# Patient Record
Sex: MALE | Race: WHITE | Employment: OTHER | URBAN - METROPOLITAN AREA
[De-identification: names, ages, dates, MRNs, and addresses within clinical notes are randomized per-mention and may not be internally consistent; named-entity substitution may affect disease eponyms.]

---

## 2017-11-09 ENCOUNTER — HOSPITAL ENCOUNTER (OUTPATIENT)
Dept: PET IMAGING | Age: 72
Discharge: HOME | End: 2017-11-09
Attending: UROLOGY
Payer: MEDICARE

## 2017-11-09 VITALS — WEIGHT: 183 LBS | HEIGHT: 70 IN | BODY MASS INDEX: 26.2 KG/M2

## 2017-11-09 DIAGNOSIS — R97.20 ELEVATED PSA: ICD-10-CM

## 2017-11-09 DIAGNOSIS — C61 MALIGNANT NEOPLASM OF PROSTATE (HCC): ICD-10-CM

## 2017-11-09 PROCEDURE — 78815 PET IMAGE W/CT SKULL-THIGH: CPT

## 2017-11-09 RX ORDER — SODIUM CHLORIDE 0.9 % (FLUSH) 0.9 %
10 SYRINGE (ML) INJECTION
Status: COMPLETED | OUTPATIENT
Start: 2017-11-09 | End: 2017-11-09

## 2017-11-09 RX ADMIN — Medication 10 ML: at 16:34

## 2017-11-28 ENCOUNTER — OFFICE VISIT (OUTPATIENT)
Dept: SURGERY | Age: 72
End: 2017-11-28

## 2017-11-28 VITALS
WEIGHT: 187 LBS | BODY MASS INDEX: 26.77 KG/M2 | HEART RATE: 64 BPM | SYSTOLIC BLOOD PRESSURE: 157 MMHG | HEIGHT: 70 IN | DIASTOLIC BLOOD PRESSURE: 80 MMHG | OXYGEN SATURATION: 95 %

## 2017-11-28 DIAGNOSIS — E04.1 LEFT THYROID NODULE: Primary | ICD-10-CM

## 2017-11-28 RX ORDER — LISINOPRIL 40 MG/1
40 TABLET ORAL DAILY
COMMUNITY

## 2017-11-28 RX ORDER — METOPROLOL TARTRATE 50 MG/1
TABLET ORAL 2 TIMES DAILY
COMMUNITY

## 2017-11-28 RX ORDER — ATORVASTATIN CALCIUM 40 MG/1
40 TABLET, FILM COATED ORAL
COMMUNITY

## 2017-11-28 NOTE — PROGRESS NOTES
HISTORY OF PRESENT ILLNESS  Epifanio Rao is a 67 y.o. male who comes in for consultation by Dr Tyrel Fierro for a thyroid nodule  HPI  He was being worked up for prostate cancer recurrence and had a PET/CT 11/10/2017 demonstrating a 2.6 cm left thyroid nodule. It was not metabolically active. He was found to have local recurrence of his prostate cancer and is to have radiation at some point. He denies feeling any neck lumps, cervical dysphagia, dysphonia, neck pain, palpitations, prior neck/chest radiation, cold/hot intolerance, fatigue, weight change. He denies family hx thyroid disease. Past Medical History:   Diagnosis Date    Cancer [facility])     prostate     Past Surgical History:   Procedure Laterality Date    CARDIAC SURG PROCEDURE UNLIST  07/07/2011    bypass    HX OTHER SURGICAL  2009    prostectomy     History reviewed. No pertinent family history. Social History   Substance Use Topics    Smoking status: Never Smoker    Smokeless tobacco: Never Used    Alcohol use Yes      Comment: daily     Current Outpatient Prescriptions   Medication Sig    lisinopril (PRINIVIL, ZESTRIL) 40 mg tablet Take 40 mg by mouth daily.  atorvastatin (LIPITOR) 40 mg tablet Take  by mouth daily.  metoprolol tartrate (LOPRESSOR) 50 mg tablet Take  by mouth two (2) times a day. No current facility-administered medications for this visit. No Known Allergies    Review of Systems   Constitutional: Negative for chills, diaphoresis, fever, malaise/fatigue and weight loss. HENT: Negative for congestion, ear pain and sore throat. Eyes: Negative for blurred vision and pain. Respiratory: Negative for cough, hemoptysis, sputum production, shortness of breath, wheezing and stridor. Cardiovascular: Negative for chest pain, palpitations, orthopnea, claudication, leg swelling and PND. Gastrointestinal: Positive for heartburn (had recent lower esophageal stricture dilatation).  Negative for abdominal pain, blood in stool, constipation, diarrhea, melena, nausea and vomiting. Genitourinary: Negative for dysuria, flank pain, frequency, hematuria and urgency. Musculoskeletal: Negative for back pain, joint pain, myalgias and neck pain. Skin: Negative for itching and rash. Neurological: Negative for dizziness, tremors, focal weakness, seizures, weakness and headaches. Endo/Heme/Allergies: Negative for polydipsia. Psychiatric/Behavioral: Negative for depression and memory loss. The patient is not nervous/anxious. Visit Vitals    /80 (BP 1 Location: Right arm, BP Patient Position: Sitting)    Pulse 64    Ht 5' 10\" (1.778 m)    Wt 84.8 kg (187 lb)    SpO2 95%    BMI 26.83 kg/m2       Physical Exam   Constitutional: He is oriented to person, place, and time. He appears well-developed and well-nourished. No distress. HENT:   Head: Normocephalic and atraumatic. Mouth/Throat: Oropharynx is clear and moist. No oropharyngeal exudate. Eyes: Conjunctivae and EOM are normal. Pupils are equal, round, and reactive to light. No scleral icterus. Neck: Trachea normal, normal range of motion, full passive range of motion without pain and phonation normal. Neck supple. No tracheal deviation present. No thyromegaly present. Mass: fullness on left but difficult to feel a mass. Cardiovascular: Normal rate, regular rhythm and normal heart sounds. Exam reveals no gallop and no friction rub. No murmur heard. Pulmonary/Chest: Effort normal and breath sounds normal. No stridor. No respiratory distress. He has no wheezes. He has no rales. Abdominal: Soft. Normal appearance and bowel sounds are normal. He exhibits no distension, no pulsatile liver and no mass. There is no hepatosplenomegaly. There is no tenderness. There is no rebound, no guarding and no CVA tenderness. No hernia. Hernia confirmed negative in the right inguinal area and confirmed negative in the left inguinal area.    Genitourinary: Testes normal. Cremasteric reflex is present. Circumcised. Musculoskeletal: Normal range of motion. He exhibits no edema or tenderness. Lymphadenopathy:     He has no cervical adenopathy. Right: No supraclavicular adenopathy present. Left: No supraclavicular adenopathy present. Neurological: He is alert and oriented to person, place, and time. No cranial nerve deficit. Coordination normal.   Skin: Skin is warm and dry. No rash noted. He is not diaphoretic. No erythema. Psychiatric: He has a normal mood and affect. His behavior is normal. Judgment and thought content normal.       ASSESSMENT and PLAN  1. Left thyroid nodule. I explained about the anatomy and pathophysiology of thyroid nodules/disease. I explained about possible malignancy. I discussed FNA, observation, possible thyroid suppression pending FNA, and total thyroidectomy. Risks of surgery include bleeding (potentially requiring emergent exploration at bedside), infection, parathyroid injury/removal requiring supplemental calcium +/- vit d, recurrent laryngeal/superior laryngeal nerve injury resulting in vocal cord paralysis, voice changes and fatigue, aspiration, further surgery, need for lifelong thyroid supplementation. US in office suggests multinodular goiter with left dominant nodule  Check TSH and free T4 levels  Did US guided FNA today  Will get formal US to document dimensions  Will call with results  2. Prostate cancer. Local recurrence. Still deciding which option for local control  3. CAD  S/p CABG 7/2011  4. GERD/esophageal stricture. S/p dilatation 11/2017 and had colonoscopy in [facility]  5.   Essential hypertension      Rocío Thomson MD FACS

## 2017-11-28 NOTE — MR AVS SNAPSHOT
Visit Information Date & Time Provider Department Dept. Phone Encounter #  
 11/28/2017 10:00 AM Cyndi Anderson MD [facility] [id number] Upcoming Health Maintenance Date Due Hepatitis C Screening 1945 DTaP/Tdap/Td series (1 - Tdap) 11/26/1966 FOBT Q 1 YEAR AGE 50-75 11/26/1995 ZOSTER VACCINE AGE 60> 9/26/2005 GLAUCOMA SCREENING Q2Y 11/26/2010 Pneumococcal 65+ High/Highest Risk (1 of 2 - PCV13) 11/26/2010 MEDICARE YEARLY EXAM 11/26/2010 Influenza Age 5 to Adult 8/1/2017 Allergies as of 11/28/2017  Review Complete On: 11/28/2017 By: Cyndi Anderson MD  
 No Known Allergies Current Immunizations  Never Reviewed No immunizations on file. Not reviewed this visit You Were Diagnosed With   
  
 Codes Comments Left thyroid nodule    -  Primary ICD-10-CM: E04.1 ICD-9-CM: 241.0 Vitals BP Pulse Height(growth percentile) Weight(growth percentile) SpO2 BMI  
 157/80 (BP 1 Location: Right arm, BP Patient Position: Sitting) 64 5' 10\" (1.778 m) 187 lb (84.8 kg) 95% 26.83 kg/m2 Smoking Status Never Smoker Vitals History BMI and BSA Data Body Mass Index Body Surface Area  
 26.83 kg/m 2 2.05 m 2 Your Updated Medication List  
  
   
This list is accurate as of: 11/28/17 10:44 AM.  Always use your most recent med list.  
  
  
  
  
 atorvastatin 40 mg tablet Commonly known as:  LIPITOR Take  by mouth daily. lisinopril 40 mg tablet Commonly known as:  Nicole Benito Take 40 mg by mouth daily. metoprolol tartrate 50 mg tablet Commonly known as:  LOPRESSOR Take  by mouth two (2) times a day. We Performed the Following T4, FREE [id number] CPT(R)] TSH 3RD GENERATION [49355 CPT(R)] To-Do List   
 11/28/2017   Imaging:  US THYROID/PARATHYROID/SOFT TISS   
  
 11/30/2017 8:00 AM  
 Appointment with Ever Santos 2 at [facility] Ultrasound ([phone]) GENERAL INSTRUCTIONS  1. Bring outside films ([employer]) pertaining to the affected area with you on the day of appointment. 2. A written order with a valid diagnosis and Physicians signature is required for all scheduled tests. 3. Check in at registration 30 minutes before your appointment time unless you were instructed to do otherwise. 12/26/2017 10:00 AM  
  Appointment with RAD ONC THERAPY [facility] at [phone] ([phone]) Introducing [facility] SERVICES! [facility] introduces iovox patient portal. Now you can access parts of your medical record, email your doctor's office, and request medication refills online. 1. In your internet browser, go to [email or web address] You a [email or web address] 2. Click on the First Time User? Click Here link in the Sign In box. You will see the New Member Sign Up page. 3. Enter your iovox Access Code exactly as it appears below. You will not need to use this code after youve completed the sign-up process. If you do not sign up before the expiration date, you must request a new code. · iovox Access Code: [email or web address] Expires: 2/6/2018  8:57 AM 
 
4. Enter the last four digits of your Social Security Number (xxxx) and Date of Birth (mm/dd/yyyy) as indicated and click Submit. You will be taken to the next sign-up page. 5. Create a [employer] ID. This will be your iovox login ID and cannot be changed, so think of one that is secure and easy to remember. 6. Create a iovox password. You can change your password at any time. 7. Enter your Password Reset Question and Answer. This can be used at a later time if you forget your password. 8. Enter your e-mail address. You will receive e-mail notification when new information is available in [address]. 9. Click Sign Up. You can now view and download portions of your medical record. 10. Click the Download Summary menu link to download a portable copy of your medical information. If you have questions, please visit the Frequently Asked Questions section of the Nveloped website. Remember, Nveloped is NOT to be used for urgent needs. For medical emergencies, dial 911. Now available from your iPhone and Android! Please provide this summary of care documentation to your next provider. Your primary care clinician is listed as ROMEO LAYTON. If you have any questions after today's visit, please call [phone].

## 2017-11-28 NOTE — PROGRESS NOTES
[facility] - [address] [address] [facility]  OFFICE PROCEDURE PROGRESS NOTE        Chart reviewed for the following:   Aga VEGA LPN, have reviewed the History, Physical and updated the Allergic reactions for Chalo Morales performed immediately prior to start of procedure:   Aga VEGA LPN, have performed the following reviews on Stephen Welsh prior to the start of the procedure:            * Patient was identified by name and date of birth   * Agreement on procedure being performed was verified  * Risks and Benefits explained to the patient  * Procedure site verified and marked as necessary  * Patient was positioned for comfort  * Consent was signed and verified     Time: 10:30am      Date of procedure: 11/28/2017    Procedure performed by:  Karine Martinez MD    Provider assisted by: Princess Payne LPN    Patient assisted by: spouse    How tolerated by patient: tolerated the procedure well with no complications    Post Procedural Pain Scale: 0 - No Hurt    Comments: none

## 2017-11-28 NOTE — PROGRESS NOTES
Procedure Note    Pre Procedure Diagnosis:  Left thyroid mass  Post Procedure Diagnosis:  Left thyroid mass  Procedure:  1. Ultrasound guided FNA of left thyroid mass  Surgeon:   Maixmus Garcia MD FACS    EBL minimal    Procedure:    After informed consent, I utilized a [employer] with a high frequency linear array transducer and five 25 gauge needles to perform several passes through a 2,6 cm left thyroid heterogeneous mass. Specimens were placed in specialized containers from [facility] for analysis. He tolerated it well.     Pathology:  Left thyroid nodule    Signed  Maximus Garcia MD FACS

## 2017-11-30 ENCOUNTER — HOSPITAL ENCOUNTER (OUTPATIENT)
Dept: ULTRASOUND IMAGING | Age: 72
Discharge: HOME | End: 2017-11-30
Attending: SURGERY
Payer: MEDICARE

## 2017-11-30 DIAGNOSIS — E04.1 LEFT THYROID NODULE: ICD-10-CM

## 2017-11-30 PROCEDURE — 76536 US EXAM OF HEAD AND NECK: CPT

## 2017-12-01 ENCOUNTER — TELEPHONE (OUTPATIENT)
Dept: SURGERY | Age: 72
End: 2017-12-01

## 2017-12-01 DIAGNOSIS — E04.2 MULTINODULAR GOITER (NONTOXIC): Primary | ICD-10-CM

## 2017-12-01 LAB
T4 FREE SERPL-MCNC: 1.05 NG/DL (ref 0.82–1.77)
TSH SERPL DL<=0.005 MIU/L-ACNC: 0.8 UIU/ML (ref 0.45–4.5)

## 2017-12-01 NOTE — TELEPHONE ENCOUNTER
US  FINDINGS:      The right thyroid lobe contains several nodules, largest 2 measuring 3.4 and 2.8  cm in maximum diameters.     Left thyroid lobe contains several nodules, largest measuring 4.4 cm in maximum  diameter.     Right lobe measures approximately 7.4 x 2.5 and 1.7 cm. Left lobe measures  approximately 6.6 x 3.5 x 2.3 cm.     IMPRESSION  IMPRESSION: Multinodular goiter. FNA cytology benign hyperplastic/adenomatoid nodule      He understands and need for follow up    Plan  1. Repeat US neck in  1 year  2.   RTC in 1 year (after US)    Lg Garrett MD FACS

## 2017-12-21 ENCOUNTER — HOSPITAL ENCOUNTER (OUTPATIENT)
Dept: MRI IMAGING | Age: 72
Discharge: HOME | End: 2017-12-21
Payer: MEDICARE

## 2017-12-21 VITALS — WEIGHT: 189 LBS | BODY MASS INDEX: 27.12 KG/M2

## 2017-12-21 DIAGNOSIS — C61 PROSTATE CANCER (HCC): ICD-10-CM

## 2017-12-21 LAB — CREAT BLD-MCNC: 0.5 MG/DL (ref 0.6–1.3)

## 2017-12-21 PROCEDURE — A9577 INJ MULTIHANCE: HCPCS

## 2017-12-21 PROCEDURE — 82565 ASSAY OF CREATININE: CPT

## 2017-12-21 PROCEDURE — 72197 MRI PELVIS W/O & W/DYE: CPT

## 2017-12-21 PROCEDURE — 74011000258 HC RX REV CODE- 258

## 2017-12-21 PROCEDURE — 74011250636 HC RX REV CODE- 250/636

## 2017-12-21 RX ADMIN — SODIUM CHLORIDE 100 ML: 900 INJECTION, SOLUTION INTRAVENOUS at 10:27

## 2017-12-21 RX ADMIN — GADOBENATE DIMEGLUMINE 17 ML: 529 INJECTION, SOLUTION INTRAVENOUS at 10:28

## 2017-12-26 ENCOUNTER — HOSPITAL ENCOUNTER (OUTPATIENT)
Dept: RADIATION THERAPY | Age: 72
Discharge: HOME | End: 2017-12-26

## 2018-09-06 ENCOUNTER — HOSPITAL ENCOUNTER (OUTPATIENT)
Dept: PREADMISSION TESTING | Age: 73
Discharge: HOME | End: 2018-09-06
Payer: MEDICARE

## 2018-09-06 VITALS — HEIGHT: 70 IN | BODY MASS INDEX: 26.83 KG/M2 | WEIGHT: 187.44 LBS

## 2018-09-06 LAB
ALBUMIN SERPL-MCNC: 3.5 G/DL (ref 3.4–5)
ALBUMIN/GLOB SERPL: 1.2 {RATIO} (ref 0.8–1.7)
ALP SERPL-CCNC: 104 U/L (ref 45–117)
ALT SERPL-CCNC: 30 U/L (ref 16–61)
ANION GAP SERPL CALC-SCNC: 9 MMOL/L (ref 3–18)
APPEARANCE UR: CLEAR
AST SERPL-CCNC: 20 U/L (ref 15–37)
BACTERIA SPEC CULT: NORMAL
BASOPHILS # BLD: 0 K/UL (ref 0–0.1)
BASOPHILS NFR BLD: 0 % (ref 0–2)
BILIRUB SERPL-MCNC: 0.6 MG/DL (ref 0.2–1)
BILIRUB UR QL: NEGATIVE
BUN SERPL-MCNC: 8 MG/DL (ref 7–18)
BUN/CREAT SERPL: 10 (ref 12–20)
CALCIUM SERPL-MCNC: 8.7 MG/DL (ref 8.5–10.1)
CHLORIDE SERPL-SCNC: 103 MMOL/L (ref 100–108)
CO2 SERPL-SCNC: 28 MMOL/L (ref 21–32)
COLOR UR: YELLOW
CREAT SERPL-MCNC: 0.77 MG/DL (ref 0.6–1.3)
DIFFERENTIAL METHOD BLD: ABNORMAL
EOSINOPHIL # BLD: 0.2 K/UL (ref 0–0.4)
EOSINOPHIL NFR BLD: 3 % (ref 0–5)
ERYTHROCYTE [DISTWIDTH] IN BLOOD BY AUTOMATED COUNT: 12.8 % (ref 11.6–14.5)
GLOBULIN SER CALC-MCNC: 3 G/DL (ref 2–4)
GLUCOSE SERPL-MCNC: 111 MG/DL (ref 74–99)
GLUCOSE UR STRIP.AUTO-MCNC: NEGATIVE MG/DL
HCT VFR BLD AUTO: 39.7 % (ref 36–48)
HGB BLD-MCNC: 13.5 G/DL (ref 13–16)
HGB UR QL STRIP: NEGATIVE
KETONES UR QL STRIP.AUTO: NEGATIVE MG/DL
LEUKOCYTE ESTERASE UR QL STRIP.AUTO: NEGATIVE
LYMPHOCYTES # BLD: 1.9 K/UL (ref 0.9–3.6)
LYMPHOCYTES NFR BLD: 26 % (ref 21–52)
MCH RBC QN AUTO: 32.5 PG (ref 24–34)
MCHC RBC AUTO-ENTMCNC: 34 G/DL (ref 31–37)
MCV RBC AUTO: 95.7 FL (ref 74–97)
MONOCYTES # BLD: 0.8 K/UL (ref 0.05–1.2)
MONOCYTES NFR BLD: 11 % (ref 3–10)
NEUTS SEG # BLD: 4.4 K/UL (ref 1.8–8)
NEUTS SEG NFR BLD: 60 % (ref 40–73)
NITRITE UR QL STRIP.AUTO: NEGATIVE
PH UR STRIP: 6 [PH] (ref 5–8)
PLATELET # BLD AUTO: 181 K/UL (ref 135–420)
PMV BLD AUTO: 10.5 FL (ref 9.2–11.8)
POTASSIUM SERPL-SCNC: 3.9 MMOL/L (ref 3.5–5.5)
PROT SERPL-MCNC: 6.5 G/DL (ref 6.4–8.2)
PROT UR STRIP-MCNC: NEGATIVE MG/DL
RBC # BLD AUTO: 4.15 M/UL (ref 4.7–5.5)
SERVICE CMNT-IMP: NORMAL
SODIUM SERPL-SCNC: 140 MMOL/L (ref 136–145)
SP GR UR REFRACTOMETRY: 1.01 (ref 1–1.03)
UROBILINOGEN UR QL STRIP.AUTO: 0.2 EU/DL (ref 0.2–1)
WBC # BLD AUTO: 7.3 K/UL (ref 4.6–13.2)

## 2018-09-06 PROCEDURE — 85025 COMPLETE CBC W/AUTO DIFF WBC: CPT | Performed by: ORTHOPAEDIC SURGERY

## 2018-09-06 PROCEDURE — 80053 COMPREHEN METABOLIC PANEL: CPT | Performed by: ORTHOPAEDIC SURGERY

## 2018-09-06 PROCEDURE — 81003 URINALYSIS AUTO W/O SCOPE: CPT | Performed by: ORTHOPAEDIC SURGERY

## 2018-09-06 PROCEDURE — 36415 COLL VENOUS BLD VENIPUNCTURE: CPT | Performed by: ORTHOPAEDIC SURGERY

## 2018-09-06 PROCEDURE — 87641 MR-STAPH DNA AMP PROBE: CPT | Performed by: ORTHOPAEDIC SURGERY

## 2018-09-06 RX ORDER — SODIUM CHLORIDE, SODIUM LACTATE, POTASSIUM CHLORIDE, CALCIUM CHLORIDE 600; 310; 30; 20 MG/100ML; MG/100ML; MG/100ML; MG/100ML
125 INJECTION, SOLUTION INTRAVENOUS CONTINUOUS
Status: CANCELLED | OUTPATIENT
Start: 2018-09-06

## 2018-09-06 RX ORDER — CEFAZOLIN SODIUM/WATER 2 G/20 ML
2 SYRINGE (ML) INTRAVENOUS ONCE
Status: CANCELLED | OUTPATIENT
Start: 2018-09-06 | End: 2018-09-06

## 2018-09-06 RX ORDER — TRANEXAMIC ACID 650 1/1
1950 TABLET ORAL ONCE
Status: CANCELLED | OUTPATIENT
Start: 2018-09-25 | End: 2018-09-25

## 2018-09-06 NOTE — PERIOP NOTES
No sleep apnea or removable prosthetic devices. Care Fusion kit given to patient with instructions. Reviewed Hernan wipes. No family history of MH. Pt meets criteria for special populations.

## 2018-09-20 ENCOUNTER — HOSPITAL ENCOUNTER (OUTPATIENT)
Dept: PHYSICAL THERAPY | Age: 73
Discharge: HOME | End: 2018-09-20
Payer: MEDICARE

## 2018-09-20 PROCEDURE — 97161 PT EVAL LOW COMPLEX 20 MIN: CPT | Performed by: PHYSICAL THERAPIST

## 2018-09-20 PROCEDURE — 97110 THERAPEUTIC EXERCISES: CPT | Performed by: PHYSICAL THERAPIST

## 2018-09-20 PROCEDURE — G8986 CARRY D/C STATUS: HCPCS | Performed by: PHYSICAL THERAPIST

## 2018-09-20 PROCEDURE — G8985 CARRY GOAL STATUS: HCPCS | Performed by: PHYSICAL THERAPIST

## 2018-09-20 PROCEDURE — G8984 CARRY CURRENT STATUS: HCPCS | Performed by: PHYSICAL THERAPIST

## 2018-09-20 NOTE — PROGRESS NOTES
[facility] - [facility]       [address], [address] [facility] [place]  Ph [phone]  Fx [phone]    Orthopedic Surgical Pre-op Assessment and Plan of Care     Patient name: Ember Davis Start of Care: 2018   Referral source: Kalli Salomon MD : 1945    Medical Diagnosis: Right shoulder pain [M25.511] Surgery Date:2018    Prior Hospitalization: see medical history Provider#: [id number]   Medications: Verified on Patient summary List   Date:2018      [x]  Patient  Verified  Payor: Kelly Mcadams / Plan: [place] PART [place] & B / Product Type: Medicare /          In time:2:30  Out time:3:30  Total Treatment Time (min): 60  Total Timed Codes (min): 30  1:1 Treatment Time ( [address] only): 60   Visit #: 1 of 1           Subjective Review:  Pain Level (0-10 scale): 5  Any medication changes, allergies to medications, adverse drug reactions, diagnosis change, or new procedure performed?: [x] No    [] Yes (see summary sheet for update)    Patient has CC of right shoulder pain for 15-20 years. NATALIE: none. Patient describes pain as throbbing, sharp, intermittent. No diurnal features. Reports tingling into the last two digits on occasion. Denies numbness/tingling. Denies popping/clicking. Aggravating factors:golf, lifting, raising arm, . Alleviating factors: CBD, . Denies red flags: SOB, chest pain, dizziness/lightheadedness, blurred/double vision, HA, chills/fevers, night sweats, change in bowel/bladder control, abdominal pain, difficulty swallowing, slurred speech, unexplained weight gain/loss. PMHx: CABG 2011, . Surgical Hx: prostate 99, . Social Hx: single level home, regular alcohol, tobacco, work status: . PLOF: golf, swimming. CLOF: unable to play [employer]. Diagnostic Imaging: x-ray: OA      Motivation: good  Substance use: [x] Alcohol     [] tobacco [] other:  Cognition: A & O x 4    Other:    What type of home do you live in now?  Single level    Do you plan to return there after surgery? Yes                   If no, where are planning to live after surgery? NA    How many stairs/steps to enter your home? 3 Railing:  Yes    How many stairs/steps inside of your home? 0 Railing:  Not applicable     Can you stay on the entry level? Yes    Do you live alone? No    Will anyone be available to help you when you leave the hospital?  Yes                If yes, what is their relationship to you? Wife         Can they assist you with bathing and dressing? Yes     Can someone assist you with transportation to physical therapy? Yes    Where do you plan to go when you leave the hospital? home    What is your employment status? [] Retired     [] Disabled     [x] Employed  []  Unemployed  If EMPLOYED, job requires mainly: [] Sitting     [] Light Activity     [] Heavy Activity  Do you own or are any of the following items available to you (check all that apply)? [] Walker: [] Wheels [] No Wheels     [] Zain Kussmala     [] Crutches     [] Shower Chair/Tub Bench   [] Bedside Commode/Elevated Toilet Seat     [] Other equipment: In your bathroom, do you have the following? Check all that apply. [] Tub Only   [] Tub/Shower Combination  [x] Walk-in Shower    Are you:  right handed     Describe your Functional Level (check all that apply):     Bathing [x] Independent   [] Require Assistance from someone   [] Sponge Bath Only        Dressing [x] Independent     [] Require assistance from someone for: [] socks/shoes   [] pants   [] everything   Walking [x] Independent   [] Walk indoors only   [] Walk outdoors   [] Use assistive device       [] Use wheelchair only        Household Activities [x] Routine house & yard work   [] Light house work only   [] None                     ROM:  [] Unable to assess at this time     Strength:   [] Unable to assess at this time                                                                                                    [] Left  [] Right Gross Strength Left Right   Elevation against gravity [x] WNL  [] Impaired  [] Unable Flexion [x] WNL  [] Impaired  [] Unable [x] WNL  [] Impaired  [] Unable   Quality of motion [] WNL  [x] Impaired   Scaption/ABD [x] WNL  [] Impaired  [] Unable [x] WNL  [] Impaired  [] Unable   Endfeel [] Soft  [x] Hard  [] Springy  [] Empty IR/ER [x] WNL  [] Impaired  [] Unable [x] WNL  [] Impaired  [] Unable     Objective Screen:  1. Functional testing to determine fall risk (to be completed with a sling on the operative arm)  a. TUG: Time 8.09sec    Assistive Device: none    Fall Risk :  No * a score of >14 seconds indicates fall risk   (low complexity <10sec, moderate complexity 10-20sec, high complexity >20sec)  b. Stair Climbing Test: Time 10sec  Assistive Device: none   Fall Risk :   No  c. Functional Screen:   Sit-Supine:  Independent           Supine-sit:  Independent                Transfers:    Independent        Static Balance: 2 trials for 30 seconds with no assistance   EO/EC    [place]: [x]Floor   []Foam  [] Other    MSR:           []Floor   []Foam  [] Other  Gait Distance/Safety:   Household Distances with none and no assistance. Capable caregiver  Not applicable  Community Distances with none  and no assistance. Capable caregiver  Not applicable    30 min Therapeutic Exercise:  [x] See flow sheet :   Rationale: increase ROM, increase strength and decrease pain to improve the patients ability to complete ADLs      Patient/Caregiver Education/Goals: To be accomplished in 1 visit  1. Caregiver/Patient demonstrates understanding of TSR precautions as stated on education form. Goal MET  Yes  2. Caregiver/Patient demonstrates and verbalizes understanding of HEP as issued on exercise handout (Pendulums, shoulder isometrics: flexion, extension, abduction, scapular squeezes, assisted elbow flexion/extension, wrist AROM, gripping). Goal MET  Yes  3.  Caregiver/Patient demonstrates understanding of [place] a shirt withTSR precautions as stated on education form. Goal MET  Yes  4. Caregiver/Patient demonstrates understanding of [place] sling withTSR precautions as stated on education form. Goal MET  Yes  Pain Level at end of session (0-10 scale): 5     The Plan of Care and following information is based on the information from the initial evaluation. Assessment/ key information: Patient is less than 1 week out from right reverse total shoulder arthroplasty on 9/25/2018. Evaluation Complexity History LOW Complexity : Zero comorbidities / personal factors that will impact the outcome / POC; Examination LOW Complexity : 1-2 Standardized tests and measures addressing body structure, function, activity limitation and / or participation in recreation  ;Presentation LOW Complexity : Stable, uncomplicated  ;Clinical Decision Making Other outcome measures DASH = 27%  LOW   Overall Complexity Rating: LOW     Problem List: pain affecting function and decrease activity tolerance     Treatment Plan may include any combination of the following: Therapeutic exercise, Therapeutic activities and Patient education    Patient / Family readiness to learn indicated by: asking questions, trying to perform skills and interest    Persons(s) to be included in education: patient (P)    Barriers to Learning/Limitations: None    Patient Goal Following Surgery: Destini ornelas in right shoulder    Patient Self Reported Health Status: good    Rehabilitation Potential: good    Recommend Discharge to: [x] Outpatient PT     [] Home Health PT     [] SNF     Frequency / Duration: Patient to be seen 1 time per week for 1 treatment    G-Codes (GP)  Carry   Current  CJ= 20-39%    Goal  CJ= 20-39%   D/C  CJ= 20-39%    The severity rating is based on clinical judgment.     Certification Period: 9/20/2018 - 9/20/2018  Donnie Kearns PT, DPT 9/20/2018 2:35 PM    ________________________________________________________________________    I certify that the above Therapy Services are being furnished while the patient is under my care. I agree with the treatment plan and certify that this therapy is necessary.     [de-identified] Signature:____________________  Date:____________Time: _________

## 2018-09-21 NOTE — H&P
Patient Name:   Torey Babb  Account #:  [de-identified]  YOB: 1945    Chief Complaint:  Right shoulder pain. History of Chief Complaint:  He had his wife come with him to discuss the details of the surgery. Past Medical/Surgical History:    Disease/Disorder Type Date Side Surgery Date Side Comment       CABG 07/07/2011         Prostatectomy 1999       Allergies:  No known allergies. Ingredient Reaction Medication Name Comment   NO KNOWN ALLERGIES          Current Medications:    Medication Directions   lisinopril    atorvastatin    metoprolol succinate    tranexamic acid 650 mg tablet Take 3 tablets 2 hours prior to surgery   tranexamic acid 650 mg tablet Take 3 tablets 2 hours prior to surgery     Social History:    SMOKING  Status Tobacco Type Units Per Day Yrs Used   Never smoker      ALCOHOL  There is a history of alcohol use. Type: Beer. 30 beers consumed weekly. Family History:    Disease Detail Family Member Age Cause of Death Comments   Cardiovascular disease Father  N    Stroke Father  N    Cardiovascular disease Mother  N      Review of Systems:    Pertinent positives include joint pain and joint stiffness. Pertinent negatives include chills, fever and numbness/tingling. Vitals:  Date BP Pulse Temp (F) Resp. (per min.) Height (Total in.) Weight (lbs.) BMI   08/01/2018     70.00  25.40     Physical Examination:  General:  Patient in no acute distress. Vital Signs: See database for vital signs. HEENT: Normal.  Neck: Supple. Chest: Clear. Heart: Regular sinus rhythm. Abdomen: Soft, nontender, no adenopathy. Neurologic: Normal exam.  Extremities:   Physical exam of the right shoulder shows  limited overall motion, reaching only 110 degrees of forward flexion. External rotation is 20 degrees beyond neutral and internal rotation is to the posterior iliac crest.  He has good strength both in external rotation and abduction.     Impression:  Glenohumeral degenerative disc disease, right shoulder, with questionable cuff. Plan:  He will be scheduled for a right reverse total shoulder arthroplasty. He understands the risks and benefits of the procedure, and he is ready to proceed. He is given a sling. Postop, he will get Percocet.

## 2018-09-25 ENCOUNTER — ANESTHESIA (OUTPATIENT)
Dept: SURGERY | Age: 73
DRG: 483 | End: 2018-09-25
Payer: MEDICARE

## 2018-09-25 ENCOUNTER — HOSPITAL ENCOUNTER (INPATIENT)
Age: 73
LOS: 1 days | Discharge: HOME | DRG: 483 | End: 2018-09-26
Attending: ORTHOPAEDIC SURGERY | Admitting: ORTHOPAEDIC SURGERY
Payer: MEDICARE

## 2018-09-25 ENCOUNTER — ANESTHESIA EVENT (OUTPATIENT)
Dept: SURGERY | Age: 73
DRG: 483 | End: 2018-09-25
Payer: MEDICARE

## 2018-09-25 ENCOUNTER — APPOINTMENT (OUTPATIENT)
Dept: GENERAL RADIOLOGY | Age: 73
DRG: 483 | End: 2018-09-25
Attending: ORTHOPAEDIC SURGERY
Payer: MEDICARE

## 2018-09-25 PROBLEM — M19.011 DJD OF RIGHT SHOULDER: Status: ACTIVE | Noted: 2018-09-25

## 2018-09-25 LAB
ABO + RH BLD: NORMAL
BLOOD GROUP ANTIBODIES SERPL: NORMAL
SPECIMEN EXP DATE BLD: NORMAL

## 2018-09-25 PROCEDURE — 76010000132 HC OR TIME 2.5 TO 3 HR: Performed by: ORTHOPAEDIC SURGERY

## 2018-09-25 PROCEDURE — 74011250636 HC RX REV CODE- 250/636: Performed by: ORTHOPAEDIC SURGERY

## 2018-09-25 PROCEDURE — 74011250637 HC RX REV CODE- 250/637: Performed by: ORTHOPAEDIC SURGERY

## 2018-09-25 PROCEDURE — 77030020256 HC SOL INJ NACL 0.9%  500ML: Performed by: ORTHOPAEDIC SURGERY

## 2018-09-25 PROCEDURE — 74011250636 HC RX REV CODE- 250/636: Performed by: ANESTHESIOLOGY

## 2018-09-25 PROCEDURE — 76060000036 HC ANESTHESIA 2.5 TO 3 HR: Performed by: ORTHOPAEDIC SURGERY

## 2018-09-25 PROCEDURE — 77030038020 HC MANFLD NEPTUNE STRY -B: Performed by: ORTHOPAEDIC SURGERY

## 2018-09-25 PROCEDURE — 74011250636 HC RX REV CODE- 250/636

## 2018-09-25 PROCEDURE — 77030033138 HC SUT PGA STRATFX J&J -B: Performed by: ORTHOPAEDIC SURGERY

## 2018-09-25 PROCEDURE — 77030020989 HC NDL NRV BLK ARRO -B: Performed by: ANESTHESIOLOGY

## 2018-09-25 PROCEDURE — 77030027138 HC INCENT SPIROMETER -A

## 2018-09-25 PROCEDURE — 77030006643: Performed by: ANESTHESIOLOGY

## 2018-09-25 PROCEDURE — 77030037875 HC DRSG MEPILEX <16IN BORD MOLN -A: Performed by: ORTHOPAEDIC SURGERY

## 2018-09-25 PROCEDURE — 77030013728 HC IRR FEM CNL STRY -B: Performed by: ORTHOPAEDIC SURGERY

## 2018-09-25 PROCEDURE — 64415 NJX AA&/STRD BRCH PLXS IMG: CPT | Performed by: ANESTHESIOLOGY

## 2018-09-25 PROCEDURE — 65270000029 HC RM PRIVATE

## 2018-09-25 PROCEDURE — 76942 ECHO GUIDE FOR BIOPSY: CPT | Performed by: ORTHOPAEDIC SURGERY

## 2018-09-25 PROCEDURE — 77030006807 HC BLD SAW RECIP KMET -B: Performed by: ORTHOPAEDIC SURGERY

## 2018-09-25 PROCEDURE — 0RRJ00Z REPLACEMENT OF RIGHT SHOULDER JOINT WITH REVERSE BALL AND SOCKET SYNTHETIC SUBSTITUTE, OPEN APPROACH: ICD-10-PCS | Performed by: ORTHOPAEDIC SURGERY

## 2018-09-25 PROCEDURE — 77030013708 HC HNDPC SUC IRR PULS STRY –B: Performed by: ORTHOPAEDIC SURGERY

## 2018-09-25 PROCEDURE — 77030031139 HC SUT VCRL2 J&J -A: Performed by: ORTHOPAEDIC SURGERY

## 2018-09-25 PROCEDURE — 74011000250 HC RX REV CODE- 250: Performed by: ORTHOPAEDIC SURGERY

## 2018-09-25 PROCEDURE — 36415 COLL VENOUS BLD VENIPUNCTURE: CPT | Performed by: ORTHOPAEDIC SURGERY

## 2018-09-25 PROCEDURE — 77030018846 HC SOL IRR STRL H20 ICUM -A: Performed by: ORTHOPAEDIC SURGERY

## 2018-09-25 PROCEDURE — 77030002922 HC SUT FBRWRE ARTH -B: Performed by: ORTHOPAEDIC SURGERY

## 2018-09-25 PROCEDURE — C1776 JOINT DEVICE (IMPLANTABLE): HCPCS | Performed by: ORTHOPAEDIC SURGERY

## 2018-09-25 PROCEDURE — 77030020255 HC SOL INJ LR 1000ML BG: Performed by: ORTHOPAEDIC SURGERY

## 2018-09-25 PROCEDURE — 76210000006 HC OR PH I REC 0.5 TO 1 HR: Performed by: ORTHOPAEDIC SURGERY

## 2018-09-25 PROCEDURE — 97116 GAIT TRAINING THERAPY: CPT

## 2018-09-25 PROCEDURE — 77030008683 HC TU ET CUF COVD -A: Performed by: ANESTHESIOLOGY

## 2018-09-25 PROCEDURE — 77030008477 HC STYL SATN SLP COVD -A: Performed by: ANESTHESIOLOGY

## 2018-09-25 PROCEDURE — 74011000250 HC RX REV CODE- 250

## 2018-09-25 PROCEDURE — 77030012890

## 2018-09-25 PROCEDURE — 73020 X-RAY EXAM OF SHOULDER: CPT

## 2018-09-25 PROCEDURE — 77030032490 HC SLV COMPR SCD KNE COVD -B: Performed by: ORTHOPAEDIC SURGERY

## 2018-09-25 PROCEDURE — 86900 BLOOD TYPING SEROLOGIC ABO: CPT | Performed by: ORTHOPAEDIC SURGERY

## 2018-09-25 PROCEDURE — 77030020782 HC GWN BAIR PAWS FLX 3M -B: Performed by: ORTHOPAEDIC SURGERY

## 2018-09-25 PROCEDURE — 97161 PT EVAL LOW COMPLEX 20 MIN: CPT

## 2018-09-25 DEVICE — IMPLANTABLE DEVICE: Type: IMPLANTABLE DEVICE | Site: SHOULDER | Status: FUNCTIONAL

## 2018-09-25 DEVICE — KIT BONE SCR L38MM DIA4.5MM GLEN SHLDR GRN COMPR LCK CAP RVS: Type: IMPLANTABLE DEVICE | Site: SHOULDER | Status: FUNCTIONAL

## 2018-09-25 DEVICE — STEM HUM DIA17MM SHLDR PRI PRESSFIT EQUINOXE: Type: IMPLANTABLE DEVICE | Site: SHOULDER | Status: FUNCTIONAL

## 2018-09-25 DEVICE — SHOULDER REV IMPL -- S4 BSV SC: Type: IMPLANTABLE DEVICE | Site: SHOULDER | Status: FUNCTIONAL

## 2018-09-25 DEVICE — SCR TORQUE DEFINING KIT -- EQUINOXE: Type: IMPLANTABLE DEVICE | Site: SHOULDER | Status: FUNCTIONAL

## 2018-09-25 DEVICE — SCR BNE LCK GLENOSPHERE -- EQUINOXE: Type: IMPLANTABLE DEVICE | Site: SHOULDER | Status: FUNCTIONAL

## 2018-09-25 DEVICE — KIT BONE SCR L42MM DIA4.5MM GLEN SHLDR YEL COMPR LCK CAP RVS: Type: IMPLANTABLE DEVICE | Site: SHOULDER | Status: FUNCTIONAL

## 2018-09-25 DEVICE — KIT BONE SCR L46MM DIA4.5MM GLEN SHLDR PUR COMPR LCK CAP RVS: Type: IMPLANTABLE DEVICE | Site: SHOULDER | Status: FUNCTIONAL

## 2018-09-25 RX ORDER — ROCURONIUM BROMIDE 10 MG/ML
INJECTION, SOLUTION INTRAVENOUS AS NEEDED
Status: DISCONTINUED | OUTPATIENT
Start: 2018-09-25 | End: 2018-09-25 | Stop reason: HOSPADM

## 2018-09-25 RX ORDER — EPHEDRINE SULFATE/0.9% NACL/PF 25 MG/5 ML
SYRINGE (ML) INTRAVENOUS AS NEEDED
Status: DISCONTINUED | OUTPATIENT
Start: 2018-09-25 | End: 2018-09-25 | Stop reason: HOSPADM

## 2018-09-25 RX ORDER — NALOXONE HYDROCHLORIDE 0.4 MG/ML
0.1 INJECTION, SOLUTION INTRAMUSCULAR; INTRAVENOUS; SUBCUTANEOUS AS NEEDED
Status: DISCONTINUED | OUTPATIENT
Start: 2018-09-25 | End: 2018-09-26 | Stop reason: HOSPADM

## 2018-09-25 RX ORDER — ATORVASTATIN CALCIUM 20 MG/1
40 TABLET, FILM COATED ORAL
Status: DISCONTINUED | OUTPATIENT
Start: 2018-09-25 | End: 2018-09-26 | Stop reason: HOSPADM

## 2018-09-25 RX ORDER — ONDANSETRON 2 MG/ML
4 INJECTION INTRAMUSCULAR; INTRAVENOUS ONCE
Status: COMPLETED | OUTPATIENT
Start: 2018-09-25 | End: 2018-09-25

## 2018-09-25 RX ORDER — PROPOFOL 10 MG/ML
INJECTION, EMULSION INTRAVENOUS AS NEEDED
Status: DISCONTINUED | OUTPATIENT
Start: 2018-09-25 | End: 2018-09-25 | Stop reason: HOSPADM

## 2018-09-25 RX ORDER — CEFAZOLIN SODIUM 2 G/50ML
2 SOLUTION INTRAVENOUS ONCE
Status: COMPLETED | OUTPATIENT
Start: 2018-09-25 | End: 2018-09-25

## 2018-09-25 RX ORDER — SODIUM CHLORIDE 0.9 % (FLUSH) 0.9 %
5-10 SYRINGE (ML) INJECTION AS NEEDED
Status: DISCONTINUED | OUTPATIENT
Start: 2018-09-25 | End: 2018-09-26 | Stop reason: HOSPADM

## 2018-09-25 RX ORDER — NALOXONE HYDROCHLORIDE 0.4 MG/ML
0.4 INJECTION, SOLUTION INTRAMUSCULAR; INTRAVENOUS; SUBCUTANEOUS AS NEEDED
Status: DISCONTINUED | OUTPATIENT
Start: 2018-09-25 | End: 2018-09-25 | Stop reason: HOSPADM

## 2018-09-25 RX ORDER — FENTANYL CITRATE 50 UG/ML
50 INJECTION, SOLUTION INTRAMUSCULAR; INTRAVENOUS
Status: DISCONTINUED | OUTPATIENT
Start: 2018-09-25 | End: 2018-09-25 | Stop reason: HOSPADM

## 2018-09-25 RX ORDER — ACETAMINOPHEN 325 MG/1
650 TABLET ORAL
Status: DISCONTINUED | OUTPATIENT
Start: 2018-09-25 | End: 2018-09-26 | Stop reason: HOSPADM

## 2018-09-25 RX ORDER — LIDOCAINE HYDROCHLORIDE 20 MG/ML
INJECTION, SOLUTION EPIDURAL; INFILTRATION; INTRACAUDAL; PERINEURAL AS NEEDED
Status: DISCONTINUED | OUTPATIENT
Start: 2018-09-25 | End: 2018-09-25 | Stop reason: HOSPADM

## 2018-09-25 RX ORDER — ASPIRIN 81 MG/1
81 TABLET ORAL DAILY
COMMUNITY

## 2018-09-25 RX ORDER — SODIUM CHLORIDE, SODIUM LACTATE, POTASSIUM CHLORIDE, CALCIUM CHLORIDE 600; 310; 30; 20 MG/100ML; MG/100ML; MG/100ML; MG/100ML
100 INJECTION, SOLUTION INTRAVENOUS CONTINUOUS
Status: DISCONTINUED | OUTPATIENT
Start: 2018-09-25 | End: 2018-09-25 | Stop reason: HOSPADM

## 2018-09-25 RX ORDER — FLUMAZENIL 0.1 MG/ML
0.2 INJECTION INTRAVENOUS
Status: DISCONTINUED | OUTPATIENT
Start: 2018-09-25 | End: 2018-09-25 | Stop reason: HOSPADM

## 2018-09-25 RX ORDER — LISINOPRIL 20 MG/1
40 TABLET ORAL DAILY
Status: DISCONTINUED | OUTPATIENT
Start: 2018-09-26 | End: 2018-09-26 | Stop reason: HOSPADM

## 2018-09-25 RX ORDER — FENTANYL CITRATE 50 UG/ML
INJECTION, SOLUTION INTRAMUSCULAR; INTRAVENOUS AS NEEDED
Status: DISCONTINUED | OUTPATIENT
Start: 2018-09-25 | End: 2018-09-25 | Stop reason: HOSPADM

## 2018-09-25 RX ORDER — ASPIRIN 81 MG/1
81 TABLET ORAL DAILY
Status: DISCONTINUED | OUTPATIENT
Start: 2018-09-26 | End: 2018-09-26 | Stop reason: HOSPADM

## 2018-09-25 RX ORDER — METOPROLOL TARTRATE 50 MG/1
50 TABLET ORAL 2 TIMES DAILY
Status: DISCONTINUED | OUTPATIENT
Start: 2018-09-25 | End: 2018-09-26 | Stop reason: HOSPADM

## 2018-09-25 RX ORDER — SODIUM CHLORIDE 0.9 % (FLUSH) 0.9 %
5-10 SYRINGE (ML) INJECTION EVERY 8 HOURS
Status: DISCONTINUED | OUTPATIENT
Start: 2018-09-25 | End: 2018-09-26 | Stop reason: HOSPADM

## 2018-09-25 RX ORDER — ONDANSETRON 2 MG/ML
INJECTION INTRAMUSCULAR; INTRAVENOUS AS NEEDED
Status: DISCONTINUED | OUTPATIENT
Start: 2018-09-25 | End: 2018-09-25 | Stop reason: HOSPADM

## 2018-09-25 RX ORDER — ONDANSETRON 2 MG/ML
4 INJECTION INTRAMUSCULAR; INTRAVENOUS
Status: DISCONTINUED | OUTPATIENT
Start: 2018-09-25 | End: 2018-09-26 | Stop reason: HOSPADM

## 2018-09-25 RX ORDER — SODIUM CHLORIDE, SODIUM LACTATE, POTASSIUM CHLORIDE, CALCIUM CHLORIDE 600; 310; 30; 20 MG/100ML; MG/100ML; MG/100ML; MG/100ML
125 INJECTION, SOLUTION INTRAVENOUS CONTINUOUS
Status: DISCONTINUED | OUTPATIENT
Start: 2018-09-25 | End: 2018-09-26 | Stop reason: HOSPADM

## 2018-09-25 RX ORDER — DIPHENHYDRAMINE HYDROCHLORIDE 50 MG/ML
12.5 INJECTION, SOLUTION INTRAMUSCULAR; INTRAVENOUS
Status: DISCONTINUED | OUTPATIENT
Start: 2018-09-25 | End: 2018-09-26 | Stop reason: HOSPADM

## 2018-09-25 RX ORDER — NEOSTIGMINE METHYLSULFATE 5 MG/5 ML
SYRINGE (ML) INTRAVENOUS AS NEEDED
Status: DISCONTINUED | OUTPATIENT
Start: 2018-09-25 | End: 2018-09-25 | Stop reason: HOSPADM

## 2018-09-25 RX ORDER — TRANEXAMIC ACID 650 1/1
1950 TABLET ORAL ONCE
Status: COMPLETED | OUTPATIENT
Start: 2018-09-25 | End: 2018-09-25

## 2018-09-25 RX ORDER — ROPIVACAINE HYDROCHLORIDE 5 MG/ML
INJECTION, SOLUTION EPIDURAL; INFILTRATION; PERINEURAL AS NEEDED
Status: DISCONTINUED | OUTPATIENT
Start: 2018-09-25 | End: 2018-09-25 | Stop reason: HOSPADM

## 2018-09-25 RX ORDER — DIPHENHYDRAMINE HCL 25 MG
25 CAPSULE ORAL
Status: DISCONTINUED | OUTPATIENT
Start: 2018-09-25 | End: 2018-09-26 | Stop reason: HOSPADM

## 2018-09-25 RX ORDER — GLYCOPYRROLATE 0.2 MG/ML
INJECTION INTRAMUSCULAR; INTRAVENOUS AS NEEDED
Status: DISCONTINUED | OUTPATIENT
Start: 2018-09-25 | End: 2018-09-25 | Stop reason: HOSPADM

## 2018-09-25 RX ORDER — MIDAZOLAM HYDROCHLORIDE 1 MG/ML
INJECTION, SOLUTION INTRAMUSCULAR; INTRAVENOUS AS NEEDED
Status: DISCONTINUED | OUTPATIENT
Start: 2018-09-25 | End: 2018-09-25 | Stop reason: HOSPADM

## 2018-09-25 RX ORDER — BUPIVACAINE HYDROCHLORIDE AND EPINEPHRINE 5; 5 MG/ML; UG/ML
INJECTION, SOLUTION EPIDURAL; INTRACAUDAL; PERINEURAL AS NEEDED
Status: DISCONTINUED | OUTPATIENT
Start: 2018-09-25 | End: 2018-09-25 | Stop reason: HOSPADM

## 2018-09-25 RX ADMIN — ONDANSETRON 4 MG: 2 INJECTION INTRAMUSCULAR; INTRAVENOUS at 11:09

## 2018-09-25 RX ADMIN — PROPOFOL 140 MG: 10 INJECTION, EMULSION INTRAVENOUS at 07:39

## 2018-09-25 RX ADMIN — ROCURONIUM BROMIDE 10 MG: 10 INJECTION, SOLUTION INTRAVENOUS at 09:16

## 2018-09-25 RX ADMIN — FENTANYL CITRATE 25 MCG: 50 INJECTION, SOLUTION INTRAMUSCULAR; INTRAVENOUS at 10:19

## 2018-09-25 RX ADMIN — METOPROLOL TARTRATE 50 MG: 50 TABLET, FILM COATED ORAL at 13:00

## 2018-09-25 RX ADMIN — Medication 10 MG: at 07:50

## 2018-09-25 RX ADMIN — Medication 5 MG: at 09:25

## 2018-09-25 RX ADMIN — GLYCOPYRROLATE 0.1 MG: 0.2 INJECTION INTRAMUSCULAR; INTRAVENOUS at 07:56

## 2018-09-25 RX ADMIN — CEFAZOLIN SODIUM 3 G: 10 INJECTION, POWDER, FOR SOLUTION INTRAVENOUS at 21:17

## 2018-09-25 RX ADMIN — ROCURONIUM BROMIDE 10 MG: 10 INJECTION, SOLUTION INTRAVENOUS at 08:52

## 2018-09-25 RX ADMIN — ROCURONIUM BROMIDE 10 MG: 10 INJECTION, SOLUTION INTRAVENOUS at 08:32

## 2018-09-25 RX ADMIN — FENTANYL CITRATE 100 MCG: 50 INJECTION, SOLUTION INTRAMUSCULAR; INTRAVENOUS at 07:17

## 2018-09-25 RX ADMIN — FENTANYL CITRATE 25 MCG: 50 INJECTION, SOLUTION INTRAMUSCULAR; INTRAVENOUS at 09:34

## 2018-09-25 RX ADMIN — ONDANSETRON 4 MG: 2 INJECTION INTRAMUSCULAR; INTRAVENOUS at 21:30

## 2018-09-25 RX ADMIN — Medication: at 15:13

## 2018-09-25 RX ADMIN — ONDANSETRON 4 MG: 2 INJECTION INTRAMUSCULAR; INTRAVENOUS at 09:15

## 2018-09-25 RX ADMIN — FENTANYL CITRATE 100 MCG: 50 INJECTION, SOLUTION INTRAMUSCULAR; INTRAVENOUS at 07:39

## 2018-09-25 RX ADMIN — METOPROLOL TARTRATE 50 MG: 50 TABLET, FILM COATED ORAL at 21:17

## 2018-09-25 RX ADMIN — FENTANYL CITRATE 50 MCG: 50 INJECTION, SOLUTION INTRAMUSCULAR; INTRAVENOUS at 10:10

## 2018-09-25 RX ADMIN — Medication 5 MG: at 08:52

## 2018-09-25 RX ADMIN — Medication 10 MG: at 07:48

## 2018-09-25 RX ADMIN — Medication 2.5 MG: at 10:16

## 2018-09-25 RX ADMIN — CEFAZOLIN SODIUM 2 G: 2 SOLUTION INTRAVENOUS at 07:53

## 2018-09-25 RX ADMIN — ROPIVACAINE HYDROCHLORIDE 30 ML: 5 INJECTION, SOLUTION EPIDURAL; INFILTRATION; PERINEURAL at 07:21

## 2018-09-25 RX ADMIN — MIDAZOLAM HYDROCHLORIDE 2 MG: 1 INJECTION, SOLUTION INTRAMUSCULAR; INTRAVENOUS at 07:17

## 2018-09-25 RX ADMIN — ROCURONIUM BROMIDE 15 MG: 10 INJECTION, SOLUTION INTRAVENOUS at 09:42

## 2018-09-25 RX ADMIN — GLYCOPYRROLATE 0.4 MG: 0.2 INJECTION INTRAMUSCULAR; INTRAVENOUS at 10:16

## 2018-09-25 RX ADMIN — FENTANYL CITRATE 25 MCG: 50 INJECTION, SOLUTION INTRAMUSCULAR; INTRAVENOUS at 10:20

## 2018-09-25 RX ADMIN — TRANEXAMIC ACID 1950 MG: 650 TABLET ORAL at 06:13

## 2018-09-25 RX ADMIN — SODIUM CHLORIDE, SODIUM LACTATE, POTASSIUM CHLORIDE, AND CALCIUM CHLORIDE 125 ML/HR: 600; 310; 30; 20 INJECTION, SOLUTION INTRAVENOUS at 06:31

## 2018-09-25 RX ADMIN — CEFAZOLIN SODIUM 3 G: 10 INJECTION, POWDER, FOR SOLUTION INTRAVENOUS at 13:34

## 2018-09-25 RX ADMIN — LIDOCAINE HYDROCHLORIDE 40 MG: 20 INJECTION, SOLUTION EPIDURAL; INFILTRATION; INTRACAUDAL; PERINEURAL at 10:26

## 2018-09-25 RX ADMIN — Medication: at 11:21

## 2018-09-25 RX ADMIN — SODIUM CHLORIDE, SODIUM LACTATE, POTASSIUM CHLORIDE, AND CALCIUM CHLORIDE 125 ML/HR: 600; 310; 30; 20 INJECTION, SOLUTION INTRAVENOUS at 21:15

## 2018-09-25 RX ADMIN — ROCURONIUM BROMIDE 20 MG: 10 INJECTION, SOLUTION INTRAVENOUS at 08:05

## 2018-09-25 RX ADMIN — FENTANYL CITRATE 25 MCG: 50 INJECTION, SOLUTION INTRAMUSCULAR; INTRAVENOUS at 09:44

## 2018-09-25 RX ADMIN — SODIUM CHLORIDE, SODIUM LACTATE, POTASSIUM CHLORIDE, AND CALCIUM CHLORIDE: 600; 310; 30; 20 INJECTION, SOLUTION INTRAVENOUS at 08:37

## 2018-09-25 RX ADMIN — GLYCOPYRROLATE 0.1 MG: 0.2 INJECTION INTRAMUSCULAR; INTRAVENOUS at 08:05

## 2018-09-25 RX ADMIN — ATORVASTATIN CALCIUM 40 MG: 20 TABLET, FILM COATED ORAL at 21:30

## 2018-09-25 RX ADMIN — ROCURONIUM BROMIDE 50 MG: 10 INJECTION, SOLUTION INTRAVENOUS at 07:39

## 2018-09-25 NOTE — IP AVS SNAPSHOT
[address] [address] 
[phone] Patient: Ishmael Morrell MRN: [id number] :1945 About your hospitalization You were admitted on:  2018 You last received care in the:  THE [facility] [address] You were discharged on:  2018 Why you were hospitalized Your primary diagnosis was:  Not on File Your diagnoses also included:  Djd Of Right Shoulder Follow-up Information Follow up With Details Comments Contact Info Nkechi Jones MD On 10/4/2018 Follow up appointment @ 10:15am [address] [address] 
[phone] Daniela Fuller MD   [address] [facility] 
[phone] Discharge Orders None A check gloria indicates which time of day the medication should be taken. My Medications ASK your doctor about these medications Instructions Each Dose to Equal  
 Morning Noon Evening Bedtime  
 aspirin delayed-release 81 mg tablet Your last dose was: Your next dose is: Take 81 mg by mouth daily. 81 mg  
    
   
   
   
  
 atorvastatin 40 mg tablet Commonly known as:  LIPITOR Your last dose was: Your next dose is: Take 40 mg by mouth nightly. Indications: hypercholesterolemia 40 mg  
    
   
   
   
  
 lisinopril 40 mg tablet Commonly known as:  Cielo Toi Your last dose was: Your next dose is: Take 40 mg by mouth daily. 40 mg  
    
   
   
   
  
 metoprolol tartrate 50 mg tablet Commonly known as:  LOPRESSOR Your last dose was: Your next dose is: Take  by mouth two (2) times a day. Discharge Instructions Shoulder Replacement Surgery: What to Expect at Home Your Recovery Shoulder replacement surgery replaces the worn parts of your shoulder joint. When you leave the hospital, your arm will be in a sling. It will be helpful if there is someone to help you at home for the next few weeks or until you have more energy and can move around better. You will go home with a bandage and stitches, staples, tissue glue, or tape strips. You can remove the bandage when your doctor tells you to. If you have staples, your doctor will remove them in 10 to 21 days. If you have stitches that are not the type that dissolve, your doctor will remove them in 10 to 14 days. Glue or tape strips will fall off on their own over time. You may still have some mild pain, and the area may be swollen for several months after surgery. Your doctor will give you medicine for the pain. You will continue the rehabilitation program (rehab) you started in the hospital. The better you do with your rehab exercises, the sooner you will get your strength and movement back. Depending on your job, you may be able to return to work as early as 2 to 3 weeks after surgery, as long as you avoid certain arm movements, such as lifting. This care sheet gives you a general idea about how long it will take for you to recover. But each person recovers at a different pace. Follow the steps below to get better as quickly as possible. How can you care for yourself at home? Activity 
  · Rest when you feel tired. You may take a nap, but don't stay in bed all day.  
  · Work with your physical therapist to learn the best way to exercise.  
  · You will have a sling to wear at night. And it's a good idea to also put a small stack of folded sheets or towels under your upper arm while you are in bed to keep your arm from dropping too far back.  
  · Your arm should stay next to your body or in front of it for several weeks, both while you are up and during sleep.  
  · Don't lift anything with the affected arm for 6 weeks.  
  · Ask your doctor when you can drive again.   · Ask your doctor when it is okay for you to have sex.  
  · Your doctor may advise you to give up activities that put stress on that shoulder. This includes sports such as weight lifting or tennis, unless your tennis arm was not the one affected. Diet 
  · By the time you leave the hospital, you will probably be eating your normal diet. If your stomach is upset, try bland, low-fat foods like plain rice, broiled chicken, toast, and yogurt. Your doctor may recommend that you take iron and vitamin supplements.  
  · Drink plenty of fluids (unless your doctor tells you not to).  
  · You may notice that your bowel movements are not regular right after your surgery. This is common. Try to avoid constipation and straining with bowel movements. You may want to take a fiber supplement every day. If you have not had a bowel movement after a couple of days, ask your doctor about taking a mild laxative. Medicines 
  · Your doctor will tell you if and when you can restart your medicines. He or she will also give you instructions about taking any new medicines.  
  · If you take blood thinners, such as warfarin (Coumadin), clopidogrel (Plavix), or aspirin, be sure to talk to your doctor. He or she will tell you if and when to start taking those medicines again. Make sure that you understand exactly what your doctor wants you to do.  
  · Be safe with medicines. Take pain medicines exactly as directed. ¨ If the doctor gave you a prescription medicine for pain, take it as prescribed. ¨ If you are not taking a prescription pain medicine, ask your doctor if you can take an over-the-counter medicine.  
  · If you think your pain medicine is making you sick to your stomach: 
¨ Take your medicine after meals (unless your doctor has told you not to). ¨ Ask your doctor for a different pain medicine.  
  · If your doctor prescribed antibiotics, take them as directed.  Don't stop taking them just because you feel better. You need to take the full course of antibiotics.  
  · If you take a blood thinner, be sure you get instructions about how to take your medicine safely. Blood thinners can cause serious bleeding problems. Incision care 
  · If your doctor told you how to care for your cut (incision), follow your doctor's instructions. You will have a dressing over the cut. A dressing helps the incision heal and protects it. Your doctor will tell you how to take care of this.  
  · If you did not get instructions, follow this general advice: ¨ If you have strips of tape on the cut the doctor made, leave the tape on for a week or until it falls off. ¨ If you have stitches or staples, your doctor will tell you when to come back to have them removed. ¨ If you have skin adhesive on the cut, leave it on until it falls off. Skin adhesive is also called glue or liquid stitches. ¨ Change the bandage every day. ¨ Wash the area daily with warm water, and pat it dry. Don't use hydrogen peroxide or alcohol. They can slow healing. ¨ You may cover the area with a gauze bandage if it oozes fluid or rubs against clothing. ¨ You may shower 24 to 48 hours after surgery. Pat the incision dry. Don't swim or take a bath for the first 2 weeks, or until your doctor tells you it is okay. Exercise 
  · Shoulder rehabilitation is a series of exercises you do after your surgery. This helps you get back your shoulder's range of motion and strength. You will work with your doctor and physical therapist to plan this exercise program. To get the best results, you need to do the exercises correctly and as often and as long as your doctor tells you.  
 Ice 
  · For pain, put ice or a cold pack on the area for 10 to 20 minutes at a time. Put a thin cloth between the ice and your skin. Follow-up care is a key part of your treatment and safety.  Be sure to make and go to all appointments, and call your doctor if you are having problems. It's also a good idea to know your test results and keep a list of the medicines you take. When should you call for help? Call 911 anytime you think you may need emergency care. For example, call if: 
  · You have severe trouble breathing.  
  · You have symptoms of a blood clot in your lung (called a pulmonary embolism). These may include: 
¨ Sudden chest pain. ¨ Trouble breathing. ¨ Coughing up blood.  
 Call your doctor now or seek immediate medical care if: 
  · You have severe or increasing pain.  
  · You have symptoms of infection, such as: 
¨ Increased pain, swelling, warmth, or redness. ¨ Red streaks or pus. ¨ A fever.  
  · You have tingling, weakness, or numbness in your arm.  
  · Your arm turns cold or changes color.  
  · You have symptoms of a blood clot in your leg (called a deep vein thrombosis). These may include: 
¨ Pain in the calf, back of the knee, thigh, or groin. ¨ Redness and swelling in the leg or groin.  
 Watch closely for changes in your health, and be sure to contact your doctor if: 
  · You do not get better as expected. Where can you learn more? Go to http://lyssa-kamran.info/. Enter F782 in the search box to learn more about \"Shoulder Replacement Surgery: What to Expect at Home. \" Current as of: March 21, 2017 Content Version: 11.7 © 6849-0447 [employer]. Care instructions adapted under license by [employer] prime (which disclaims liability or warranty for this information). If you have questions about a medical condition or this instruction, always ask your healthcare professional. [address] [address] any warranty or liability for your use of this information. DISCHARGE SUMMARY from Nurse PATIENT INSTRUCTIONS: 
 
 
F-face looks uneven A-arms unable to move or move unevenly S-speech slurred or non-existent T-time-call 911 as soon as signs and symptoms begin-DO NOT go Back to bed or wait to see if you get better-TIME IS BRAIN. Warning Signs of HEART ATTACK Call 911 if you have these symptoms: 
? Chest discomfort. Most heart attacks involve discomfort in the center of the chest that lasts more than a few minutes, or that goes away and comes back. It can feel like uncomfortable pressure, squeezing, fullness, or pain. ? Discomfort in other areas of the upper body. Symptoms can include pain or discomfort in one or both arms, the back, neck, jaw, or stomach. ? Shortness of breath with or without chest discomfort. ? Other signs may include breaking out in a cold sweat, nausea, or lightheadedness. Don't wait more than five minutes to call [address]! Fast action can save your life. Calling 911 is almost always the fastest way to get lifesaving treatment. Emergency Medical Services staff can begin treatment when they arrive  up to an hour sooner than if someone gets to the hospital by car. The discharge information has been reviewed with the patient. The patient verbalized understanding. Discharge medications reviewed with the patient and appropriate educational materials and side effects teaching were provided. Patient armband removed and shredded 
 
___________________________________________________________________________________________________________________________________ Introducing [facility]!    
 Stephanie Kurtz introduces GIGAS patient portal. Now you can access parts of your medical record, email your doctor's office, and request medication refills online. 1. In your internet browser, go to [email or web address]". Luxera[employer]ReNew Powert 2. Click on the First Time User? Click Here link in the Sign In box. You will see the New Member Sign Up page. 3. Enter your [employer] Access Code exactly as it appears below. You will not need to use this code after youve completed the sign-up process. If you do not sign up before the expiration date, you must request a new code. · Wantering Access Code: [email or web address] Expires: 11/19/2018  3:02 PM 
 
4. Enter the last four digits of your Social Security Number (xxxx) and Date of Birth (mm/dd/yyyy) as indicated and click Submit. You will be taken to the next sign-up page. 5. Create a Wantering ID. This will be your Wantering login ID and cannot be changed, so think of one that is secure and easy to remember. 6. Create a Wantering password. You can change your password at any time. 7. Enter your Password Reset Question and Answer. This can be used at a later time if you forget your password. 8. Enter your e-mail address. You will receive e-mail notification when new information is available in [address] 9. Click Sign Up. You can now view and download portions of your medical record. 10. Click the Download Summary menu link to download a portable copy of your medical information. If you have questions, please visit the Frequently Asked Questions section of the Wantering website. Remember, Wantering is NOT to be used for urgent needs. For medical emergencies, dial 911. Now available from your iPhone and Android! Introducing Jamie Rangel As a Kaia Rangel patient, I wanted to make you aware of our electronic visit tool called Jamie Rangel. Kaia Rangel 24/7 allows you to connect within minutes with a medical provider 24 hours a day, seven days a week via a mobile device or tablet or logging into a secure website from your computer. You can access Parantez from anywhere in the [place]. A virtual visit might be right for you when you have a simple condition and feel like you just dont want to get out of bed, or cant get away from work for an appointment, when your regular [address] [place] [address] provider is not available (evenings, weekends or holidays), or when youre out of town and need minor care. Electronic visits cost only $49 and if the [address] [place] [address] 24/7 provider determines a prescription is needed to treat your condition, one can be electronically transmitted to a nearby pharmacy*. Please take a moment to enroll today if you have not already done so. The enrollment process is free and takes just a few minutes. To enroll, please download the [facility] 24/7 tremayne to your tablet or phone, or visit [email or web address] to enroll on your computer. And, as an [address] [place] [address] patient with a EndoDex account, the results of your visits will be scanned into your electronic medical record and your primary care provider will be able to view the scanned results. We urge you to continue to see your regular [address] [place] [address] provider for your ongoing medical care. And while your primary care provider may not be the one available when you seek a [facility] virtual visit, the peace of mind you get from getting a real diagnosis real time can be priceless. For more information on Parantez, view our Frequently Asked Questions (FAQs) at [email or web address]. Sincerely, 
 
Sammy Castelan MD 
Chief Medical Officer Esthela Oneil *:  certain medications cannot be prescribed via [facility] Providers Seen During Your Hospitalization Provider Specialty Primary office phone Nora Metz [facility] [phone] Your Primary Care Physician (PCP) Primary Care Physician Office Phone Office Fax Dionicio Dylan Duncan [address] [facility]. [phone] You are allergic to the following No active allergies Recent Documentation Height Weight BMI Smoking Status 1.772 m 81.7 kg 26.04 kg/m2 Never Smoker Emergency Contacts Name Discharge Info Relation Home Work Mobile Conchita Horne DISCHARGE CAREGIVER [3] Spouse [3]   [phone] Patient Belongings The following personal items are in your possession at time of discharge: 
  Dental Appliances: None  Visual Aid: Glasses, With patient      Home Medications: None   Jewelry: None  Clothing: At bedside    Other Valuables: Cell Phone  Personal Items Sent to Safe:  (none) Please provide this summary of care documentation to your next provider. Signatures-by signing, you are acknowledging that this After Visit Summary has been reviewed with you and you have received a copy. Patient Signature:  ____________________________________________________________ Date:  ____________________________________________________________  
  
Katherine Kortney Provider Signature:  ____________________________________________________________ Date:  ____________________________________________________________

## 2018-09-25 NOTE — INTERVAL H&P NOTE
H&P Update:  Yunier Milner was seen and examined. History and physical has been reviewed. The patient has been examined. There have been no significant clinical changes since the completion of the originally dated History and Physical.  Patient identified by surgeon; surgical site was confirmed by patient and surgeon.     Signed By: Maye Ching MD     September 25, 2018 7:28 AM

## 2018-09-25 NOTE — OP NOTES
PREOPERATIVE DIAGNOSIS: Cuff Tear Arthropathy, right shoulder. POSTOPERATIVE DIAGNOSIS: Cuff Tear Arthropathy, right shoulder. PROCEDURE: right Reverse Total Shoulder Arthroplasty [employer]ech    IMPLANTS:   Implant Name Type Inv. Item Serial No.  Lot No. LRB No. Used Action   SCR BNE LCK GLENOSPHERE -- EQUINOXE - [id number]  SCR BNE LCK GLENOSPHERE -- Alphonsa Royals [id number] [employer]  Right 1 Implanted   SCR TORQUE DEFINING KIT -- EQUINOXE - [id number]  SCR TORQUE DEFINING KIT -- Alphonsa Royals [id number] [employer]  Right 1 Implanted   PLATE ANTHONY HUM ADPT REV +5MM -- Alphonsa Royals - [id number]  PLATE ANTHONY HUM ADPT REV +5MM -- Alphonsa Royals [id number] [employer]  Right 1 Implanted   HEAD GLENOSPHERE REV 46MM -- EQUINOXE - [id number]  HEAD GLENOSPHERE REV 46MM -- EQUINOXE [id number] [employer]  Right 1 Implanted   SCR LCK CAP KIT 4.5X46MM RED -- EQUINOXE - [id number]  SCR LCK CAP KIT 4.5X46MM RED -- Alphonsa Royals [id number] [employer]  Right 1 Implanted   SCR LCK CAP KIT 4.5X42MM RED -- EQUINOXE - [id number]  SCR LCK CAP KIT 4.5X42MM RED -- Alphonsa Royals [id number] [employer]  Right 1 Implanted   SCR LCK CAP KIT 4.5X38MM RED -- EQUINOXE - [id number]  SCR LCK CAP KIT 4.5X38MM RED -- Alphonsa Royals [id number] [employer]  Right 1 Implanted   STEM HUM PF PRIMARY 17. 0MM -- Alphonsa Royals - [id number]  STEM HUM PF PRIMARY 17. 0MM -- Alphonsa Royals [id number] [employer]  Right 1 Implanted   TRAY HUM ADPT REV +10MM -- Alphonsa Royals - [id number]  TRAY HUM ADPT REV +10MM -- Alphonsa Royals [id number] [employer]  Right 1 Implanted   LINER HUM REV 4MM +0 -- EQUINOXE - [id number]   LINER HUM REV 4MM +0 -- Alphonsa Royals [id number] [employer]   Right 1 Implanted       SURGEON: Roger Chilel MD    ANESTHESIOLOGIST: Anesthesiologist: Rafy Nino DO  CRNA: Chadd Gutierrez CRNA     ANAESTHESIA: general anesthesia after scalene block. COMPLICATIONS: None. ESTIMATED BLOOD LOSS: 100 cc     DRAINS:  None. DESCRIPTION OF PROCEDURE: This 67 y. o.year-old male  with a history of persistent right shoulder pain and weakness, had a history of an unrepairable rotator cuff tear and severe DJD of the glenohumeral joint. The patient then opted to proceed with reconstructive surgery. The patient was taken to the operating room and placed in the beach-chair position, and the right shoulder was prepped and draped in a normal manner. The skin was injected with 0.5% Marcaine with epinephrine. An oblique incision was made over the anterior aspect of the shoulder. The incision was carried through the subcutaneous tissue through the deep fascia and the cephalic vein was retracted laterally. The dissection was carried around the humeral head. The biceps was dissected free, tenodesed distally with #2 FiberWire suture and the intraarticular portion was excised. The lesser tuberosity was osteotomized and the subscapularis was freed circumferentially and allowed to retract medially with stay sutures of #2 FiberWire. The humeral head was dislocated, and it was noted to have severe arthritic changes, along with a massive unrepairable tear of the rotator cuff. The head was osteotomized using a saw guide. The shaft was prepared using sequential reamers. The humeral head was retracted inferiorly and posteriorly. Attention was then turned to the glenoid, which showed severe arthritic changes. The labrum was excised circumferentially. The glenoid reamer was then used and finally the metaglene was placed in position, it was then secured using 3 screws, all of which were locking. The  glenosphere was then screwed in position. The proximal humerus was then prepared and trials were placed in position with sequential reductions. The trials were removed. The shaft was irrigated using antiobiotic solution via pulsed lavage. The #2 FiberWire sutures were passed through the area of the lesser tuberosity, and the sutures were passed around the humeral component. Trial reductions were again performed.  The final humeral  component was placed in position. The shoulder was reduced. Good motion with no sign of instability was confirmed. The wound was thoroughly irrigated with antibiotic solution. Hemostasis was assured using a Bovie. The lesser tuberosity was reattached using the #2 FiberWire sutures. The delto-pectoral interval was closed using 0 Vicryl suture. The subcutaneous tissue was closed with 2-0 vicryl and the skin with a Quill stitch. A sterile compressive dressing was applied, along with a sling. The patient left the operating room in satisfactory condition.

## 2018-09-25 NOTE — PROGRESS NOTES
1624: Assessment completed. Patient is A&OX4. Patient is calm and cooperative. Patient's oral mucosa pink and moist, strong on both upper extremities, sensation present to both hands except the right index fingers is numb. Lung sound clear, not appear distress. Bowel sounds active. Pedal pulses are palpable, no complain of any numbness or tingling. IV site dry and dressing intact. Mepilex dressing to right shoulder is clean and dry, Ice is applied. SCD and Janusz hose are applied. Pain is 3/10.  bed is in lower position, call bell and personal items are within reach. Pain regimen and activities are educated, educated pt to call when getting up to prevent fall. Pt verbalized understanding.

## 2018-09-25 NOTE — PERIOP NOTES
Patient transferred to room 219. Gely Young, VISH assuming care. Blood pressure 140/74, pulse 61, temperature 97.3 °F (36.3 °C), resp. rate 18, height 5' 9.75\" (1.772 m), weight 81.7 kg (180 lb 3 oz), SpO2 95 %. Dual skin assessment completed.

## 2018-09-25 NOTE — PROGRESS NOTES
Problem: Falls - Risk of  Goal: *Absence of Falls  Document Jahaira Fall Risk and appropriate interventions in the flowsheet.    Outcome: Progressing Towards Goal  Fall Risk Interventions:            Medication Interventions: Teach patient to arise slowly    Elimination Interventions: Call light in reach, Patient to call for help with toileting needs

## 2018-09-25 NOTE — IP AVS SNAPSHOT
Claudio Justuso 
 
 
 [address] [facility] [address] 
[phone] Patient: See Oro MRN: [id number] :1945 A check gloria indicates which time of day the medication should be taken. My Medications ASK your doctor about these medications Instructions Each Dose to Equal  
 Morning Noon Evening Bedtime  
 aspirin delayed-release 81 mg tablet Your last dose was: Your next dose is: Take 81 mg by mouth daily. 81 mg  
    
   
   
   
  
 atorvastatin 40 mg tablet Commonly known as:  LIPITOR Your last dose was: Your next dose is: Take 40 mg by mouth nightly. Indications: hypercholesterolemia 40 mg  
    
   
   
   
  
 lisinopril 40 mg tablet Commonly known as:  Alisha Reasons Your last dose was: Your next dose is: Take 40 mg by mouth daily. 40 mg  
    
   
   
   
  
 metoprolol tartrate 50 mg tablet Commonly known as:  LOPRESSOR Your last dose was: Your next dose is: Take  by mouth two (2) times a day.

## 2018-09-25 NOTE — ROUTINE PROCESS
3:04 PM  Bedside and Verbal shift change report given to Anselm Ganser, RN (oncoming nurse) by Parish Navarro RN (offgoing nurse). Report included the following information SBAR, Kardex and MAR.

## 2018-09-25 NOTE — PERIOP NOTES
Reviewed PTA medication list with patient/caregiver and patient/caregiver denies any additional medications. Patient admits to having a responsible adult care for them at home for at least 24 hours after surgery. Pt denies having an active cough and confirms being able to lie still in the supine position for at least 20 minutes. Patient denies lupillo chewing/swallowing difficulties. Dual skin assessment completed with Christian Marie RN.

## 2018-09-25 NOTE — PERIOP NOTES
Patient assigned to room 219. Kami Del Valle RN looking at [employer]. Family updated.

## 2018-09-25 NOTE — ANESTHESIA PREPROCEDURE EVALUATION
Anesthetic History No history of anesthetic complications Review of Systems / Medical History Patient summary reviewed, nursing notes reviewed and pertinent labs reviewed Pulmonary Within defined limits Neuro/Psych Within defined limits Cardiovascular Hypertension: well controlled CAD, CABG and hyperlipidemia Pertinent negatives: No past MI, dysrhythmias and CHF Exercise tolerance: >4 METS 
  
GI/Hepatic/Renal 
Within defined limits Endo/Other Arthritis Pertinent negatives: No diabetes, hypothyroidism, obesity and blood dyscrasia Other Findings Physical Exam 
 
Airway Mallampati: II 
TM Distance: 4 - 6 cm Neck ROM: normal range of motion Mouth opening: Normal 
 
 Cardiovascular Regular rate and rhythm,  S1 and S2 normal,  no murmur, click, rub, or gallop Dental 
No notable dental hx Pulmonary Breath sounds clear to auscultation Abdominal 
GI exam deferred Other Findings Anesthetic Plan ASA: 3 Anesthesia type: general and regional - interscalene block Induction: Intravenous Anesthetic plan and risks discussed with: Patient and Spouse GA with single shot ISB for postop pain control. Continuous ISB offered but patient prefers single shot.

## 2018-09-25 NOTE — ANESTHESIA PROCEDURE NOTES
Peripheral Block Start time: 9/25/2018 7:17 AM 
End time: 9/25/2018 7:24 AM 
Performed by: Joane Claude Authorized by: Joane Claude Pre-procedure: Indications: at surgeon's request and post-op pain management Preanesthetic Checklist: patient identified, risks and benefits discussed, site marked, timeout performed, anesthesia consent given and patient being monitored Timeout Time: 07:17 Block Type:  
Block Type: Interscalene Laterality:  Right Monitoring:  Standard ASA monitoring, continuous pulse ox, frequent vital sign checks, heart rate, oxygen and responsive to questions Injection Technique:  Single shot Procedures: ultrasound guided and nerve stimulator Patient Position: supine (hob 45 degrees) Prep: chlorhexidine Location:  Interscalene Needle Type:  Stimuplex Needle Gauge:  21 G Needle Localization:  Ultrasound guidance and nerve stimulator Motor Response: minimal motor response >0.4 mA Medication Injected:  0.5% 
ropivacaine Volume (mL):  30 
 
Assessment: 
Number of attempts:  1 Injection Assessment:  Incremental injection every 5 mL, local visualized surrounding nerve on ultrasound, negative aspiration for CSF, negative aspiration for blood, no paresthesia, no intravascular symptoms and ultrasound image on chart Patient tolerance:  Patient tolerated the procedure well with no immediate complications

## 2018-09-25 NOTE — PERIOP NOTES
TRANSFER - OUT REPORT:    Verbal report given to Grant Ann RN(name) on Luciano Tuttle  being transferred to room 219(unit) for routine post - op       Report consisted of patients Situation, Background, Assessment and   Recommendations(SBAR). Information from the following report(s) SBAR was reviewed with the receiving nurse. Lines:   Peripheral IV 09/25/18 Left; Inner Forearm (Active)   Site Assessment Clean, dry, & intact 9/25/2018 10:57 AM   Phlebitis Assessment 0 9/25/2018 10:57 AM   Infiltration Assessment 0 9/25/2018 10:57 AM   Dressing Status Clean, dry, & intact 9/25/2018 10:57 AM   Dressing Type Transparent 9/25/2018 10:57 AM   Hub Color/Line Status Green; Infusing 9/25/2018 10:57 AM   Alcohol Cap Used No 9/25/2018  6:34 AM        Opportunity for questions and clarification was provided.       Patient transported with:   O2 @ 2 liters   Prescription

## 2018-09-25 NOTE — ROUTINE PROCESS
1150  TRANSFER - IN REPORT:    Verbal report received from One Siskin Lynn, RN(name) on Liane Henao  being received from [facility]) for routine post - op      Report consisted of patients Situation, Background, Assessment and   Recommendations(SBAR). Information from the following report(s) SBAR, Kardex, [facility] and Recent Results was reviewed with the receiving nurse. Opportunity for questions and clarification was provided. Assessment completed upon patients arrival to unit and care assumed.

## 2018-09-25 NOTE — ANESTHESIA POSTPROCEDURE EVALUATION
Post-Anesthesia Evaluation & Assessment Visit Vitals  /71  Pulse 66  Temp 36.7 °C (98 °F)  Resp 14  
 Ht 5' 9.75\" (1.772 m)  Wt 81.7 kg (180 lb 3 oz)  SpO2 97%  BMI 26.04 kg/m2 Nausea/Vomiting: Controlled. Post-operative hydration adequate. Pain Scale 1: Numeric (0 - 10) (09/25/18 1057) Pain Intensity 1: 0 (09/25/18 1057) Managed Pain score at or below stated goal level. Mental status & Level of consciousness: alert and oriented x 3 Neurological status: right upper extremity weakness secondary to nerve block Pulmonary status: airway patent, adequate oxygenation. Complications related to anesthesia: none Patient has met all PACU discharge requirements.  
 
 
Aide Cao DO

## 2018-09-25 NOTE — PROGRESS NOTES
Problem: Mobility Impaired (Adult and Pediatric)  Goal: *Acute Goals and Plan of Care (Insert Text)  In 1-7 days pt will be able to meet following goals:  ST. Supine to sit to sit CGA/S with HR for positioning. 2.  Transfers:   Sit to stand to chair CGA/S with LRAD for ADLs. LT.  Gait:  Ambulate >100ft CGA/S with LRAD, no LOB, for home/community mobility. 2.  Stair Negotiation:  Ascend/descend >2 steps CGA for home entry. 3.  Patient/Family Education:   Independent with HEP and demonstrate competency with don/doff sling for follow-up care and safe discharge. physical Therapy EVALUATION    Patient: Marika Hansen (26 y.o. male)  Date: 2018  Primary Diagnosis: RIGHT SHOULDER DEGENERATIVE JOINT DISEASE  DJD of right shoulder  Procedure(s) (LRB):  EXACTECH REVERSE TOTAL RIGHT SHOULDER ARTHROPLASTY, GENERAL ANESTHESIA W/SCALENE BLOCK PRN, \"SPEC POP\" (Right) Day of Surgery   Precautions:   Fall    ASSESSMENT :  Based on the objective data described below, the patient presents with decreased functional mobility and independence in regard to bed mobility, transfers, gt quality and tolerance, R shoulder/UE AROM/strength, pain, stair negotiation and safety due to recent R TSR rev surgery. Pt rating pain in R shoulder blade 7/10 on numerical pain scale. Pt in bathroom w/ CNA upon arrival.  Pt is R UE dominant. Pt able to participate in gt training w/ R sling. Pt instructed in therapeutic exercises including thumb/finger opposition, , wrist flex/ext, elbow flex/ext AA and AA shoulder flex sitting EOB. Adjusted R sling for optimal support. Pt returned to supine in bed w/ all needs within reach, SCDs applied and ice pack to R shoulder. Nurse Yifan Parra aware and present. Recommend [facility] d/c. Patient will benefit from skilled intervention to address the above impairments.   Patients rehabilitation potential is considered to be Good  Factors which may influence rehabilitation potential include:   []         None noted  []         Mental ability/status  []         Medical condition  []         Home/family situation and support systems  []         Safety awareness  [x]         Pain tolerance/management  []         Other:      PLAN :  Recommendations and Planned Interventions:  [x]           Bed Mobility Training             []    Neuromuscular Re-Education  [x]           Transfer Training                   []    Orthotic/Prosthetic Training  [x]           Gait Training                          []    Modalities  [x]           Therapeutic Exercises          []    Edema Management/Control  [x]           Therapeutic Activities            [x]    Patient and Family Training/Education  []           Other (comment):    Frequency/Duration: Patient will be followed by physical therapy twice daily to address goals. Discharge Recommendations: Home Health  Further Equipment Recommendations for Discharge: N/A     SUBJECTIVE:   Patient stated My shoulder blade is really hurting me.     OBJECTIVE DATA SUMMARY:     Past Medical History:   Diagnosis Date    Arthritis     osteo-right foot    CAD (coronary artery disease) 2011    tightness in the chest-lead him to the MD    Cancer [facility])     prostate    Hypertension     Ill-defined condition     hemorrhroids     Past Surgical History:   Procedure Laterality Date    CABG, ARTERY-VEIN, FOUR  07/07/2011    bypass    HX HERNIA REPAIR Right     HX ORTHOPAEDIC Left     foot surgery    HX UROLOGICAL  2009    prostatectomy    HX UROLOGICAL  01/2018    proton therapy for return of cancer     Barriers to Learning/Limitations: None  Compensate with: visual, verbal, tactile, kinesthetic cues/model  Prior Level of Function/Home Situation:   Home Situation  Home Environment: Private residence  # Steps to Enter: 4  Rails to Enter: Yes  One/Two Story Residence: One story  Living Alone: No  Support Systems: Spouse/Significant Other/Partner  Patient Expects to be Discharged to[de-identified] Private residence  Current DME Used/Available at Home: Brace/Splint  Critical Behavior:  Neurologic State: Alert; Appropriate for age  Orientation Level: Oriented X4  Cognition: Appropriate decision making; Appropriate for age attention/concentration; Appropriate safety awareness; Follows commands  Safety/Judgement: Awareness of environment  Psychosocial  Patient Behaviors: Calm; Cooperative  Purposeful Interaction: Yes  Pt Identified Daily Priority: Clinical issues (comment)  Caring Interventions: Reassure  Reassure: Therapeutic listening; Informing  Skin Condition/Temp: Dry;Warm  Skin Integrity: Incision (comment) (R shoulder)  Skin Integumentary  Skin Color: Appropriate for ethnicity  Skin Condition/Temp: Dry;Warm  Skin Integrity: Incision (comment) (R shoulder)  Turgor: Epidermis thin w/ loss of subcut tissue  Hair Growth: Present  Strength:    Strength: Generally decreased, functional  Tone & Sensation:   Tone: Normal  Sensation: Intact  Range Of Motion:  AROM: Generally decreased, functional  Functional Mobility:  Bed Mobility:  Sit to Supine: Contact guard assistance (vc)  Scooting: Contact guard assistance (vc)  Transfers:  Sit to Stand: Contact guard assistance (vc)  Stand to Sit: Contact guard assistance (vc)  Balance:   Sitting: Intact  Standing: Intact; With support  Ambulation/Gait Training:  Distance (ft): 32 Feet (ft)  Assistive Device: Brace/Splint  Ambulation - Level of Assistance: Contact guard assistance;Stand-by assistance (vc)  Gait Abnormalities: Altered arm swing;Path deviations  Base of Support: Widened  Stance: Weight shift;Time  Speed/Shweta: Slow  Step Length: Left shortened;Right shortened  Swing Pattern: Left asymmetrical;Right asymmetrical  Interventions: Safety awareness training; Tactile cues; Verbal cues; Visual/Demos  Therapeutic Exercises:   Pt instructed in R thumb/finger opposition, wrist flex/ext, AA elbow flex and AA shoulder flex w/ pt able to return reps.   Pain:  Pain Scale 1: Numeric (0 - 10)  Pain Intensity 1: 7  Pain Location 1: Shoulder  Pain Orientation 1: Right  Activity Tolerance:   Fair   Please refer to the flowsheet for vital signs taken during this treatment. After treatment:   []         Patient left in no apparent distress sitting up in chair  [x]         Patient left in no apparent distress in bed  [x]         Call bell left within reach  [x]         Nursing notified  []         Caregiver present  []         Bed alarm activated    COMMUNICATION/EDUCATION:   [x]         Fall prevention education was provided and the patient/caregiver indicated understanding. [x]         Patient/family have participated as able in goal setting and plan of care. [x]         Patient/family agree to work toward stated goals and plan of care. []         Patient understands intent and goals of therapy, but is neutral about his/her participation. []         Patient is unable to participate in goal setting and plan of care. Thank you for this referral.  Luís Lopez, PT   Time Calculation: 24 mins      Eval Complexity: History: MEDIUM  Complexity : 1-2 comorbidities / personal factors will impact the outcome/ POC Exam:MEDIUM Complexity : 3 Standardized tests and measures addressing body structure, function, activity limitation and / or participation in recreation  Presentation: LOW Complexity : Stable, uncomplicated  Clinical Decision Making:Low Complexity amb >30' Overall Complexity:LOW      Mobility  Current  CI= 1-19%   Goal  CI= 1-19%. The severity rating is based on the Level of Assistance required for Functional Mobility and ADLs.

## 2018-09-26 VITALS
RESPIRATION RATE: 17 BRPM | DIASTOLIC BLOOD PRESSURE: 87 MMHG | BODY MASS INDEX: 25.8 KG/M2 | OXYGEN SATURATION: 100 % | SYSTOLIC BLOOD PRESSURE: 171 MMHG | HEART RATE: 66 BPM | WEIGHT: 180.19 LBS | HEIGHT: 70 IN | TEMPERATURE: 98.1 F

## 2018-09-26 LAB — HGB BLD-MCNC: 13.3 G/DL (ref 13–16)

## 2018-09-26 PROCEDURE — 74011250637 HC RX REV CODE- 250/637: Performed by: ORTHOPAEDIC SURGERY

## 2018-09-26 PROCEDURE — 36415 COLL VENOUS BLD VENIPUNCTURE: CPT | Performed by: ORTHOPAEDIC SURGERY

## 2018-09-26 PROCEDURE — 74011250636 HC RX REV CODE- 250/636: Performed by: ORTHOPAEDIC SURGERY

## 2018-09-26 PROCEDURE — 85018 HEMOGLOBIN: CPT | Performed by: ORTHOPAEDIC SURGERY

## 2018-09-26 PROCEDURE — 97116 GAIT TRAINING THERAPY: CPT

## 2018-09-26 RX ORDER — HYDROMORPHONE HYDROCHLORIDE 2 MG/1
2-4 TABLET ORAL
Status: DISCONTINUED | OUTPATIENT
Start: 2018-09-25 | End: 2018-09-26 | Stop reason: HOSPADM

## 2018-09-26 RX ORDER — HYDROMORPHONE HYDROCHLORIDE 4 MG/1
TABLET ORAL
Status: DISPENSED
Start: 2018-09-26 | End: 2018-09-26

## 2018-09-26 RX ADMIN — HYDROMORPHONE HYDROCHLORIDE 4 MG: 4 TABLET ORAL at 00:55

## 2018-09-26 RX ADMIN — METOPROLOL TARTRATE 50 MG: 50 TABLET, FILM COATED ORAL at 09:05

## 2018-09-26 RX ADMIN — ACETAMINOPHEN 650 MG: 325 TABLET ORAL at 02:56

## 2018-09-26 RX ADMIN — CEFAZOLIN SODIUM 3 G: 10 INJECTION, POWDER, FOR SOLUTION INTRAVENOUS at 05:46

## 2018-09-26 RX ADMIN — HYDROMORPHONE HYDROCHLORIDE 4 MG: 4 TABLET ORAL at 05:50

## 2018-09-26 RX ADMIN — ASPIRIN 81 MG: 81 TABLET, COATED ORAL at 09:05

## 2018-09-26 RX ADMIN — LISINOPRIL 40 MG: 20 TABLET ORAL at 09:04

## 2018-09-26 RX ADMIN — ACETAMINOPHEN 650 MG: 325 TABLET ORAL at 09:25

## 2018-09-26 RX ADMIN — ACETAMINOPHEN 650 MG: 325 TABLET ORAL at 13:32

## 2018-09-26 NOTE — PROGRESS NOTES
2045 - Dr. Nydia Kay paged regarding nausea. 2049 - Received call back from Dr. Nydia Kay. Informed him that patient was nauseous and had nothing ordered. Received order for Zofran 4 mg IV q4 PRN.

## 2018-09-26 NOTE — ROUTINE PROCESS
Bedside and Verbal shift change report given to Gretchen GARCIAS RN (oncoming nurse) by [place]. Katty Lutz (offgoing nurse). Report included the following information SBAR, Kardex, Intake/Output, MAR and Alarm Parameters .

## 2018-09-26 NOTE — ROUTINE PROCESS
2041 - Bedside report received from [address] Patient up to chair at this time. Pain 3/10. Plan of care for the day addressed with the patient. Will continue to monitor.         \

## 2018-09-26 NOTE — DISCHARGE INSTRUCTIONS
Shoulder Replacement Surgery: What to Expect at Home  Your Recovery    Shoulder replacement surgery replaces the worn parts of your shoulder joint. When you leave the hospital, your arm will be in a sling. It will be helpful if there is someone to help you at home for the next few weeks or until you have more energy and can move around better. You will go home with a bandage and stitches, staples, tissue glue, or tape strips. You can remove the bandage when your doctor tells you to. If you have staples, your doctor will remove them in 10 to 21 days. If you have stitches that are not the type that dissolve, your doctor will remove them in 10 to 14 days. Glue or tape strips will fall off on their own over time. You may still have some mild pain, and the area may be swollen for several months after surgery. Your doctor will give you medicine for the pain. You will continue the rehabilitation program (rehab) you started in the hospital. The better you do with your rehab exercises, the sooner you will get your strength and movement back. Depending on your job, you may be able to return to work as early as 2 to 3 weeks after surgery, as long as you avoid certain arm movements, such as lifting. This care sheet gives you a general idea about how long it will take for you to recover. But each person recovers at a different pace. Follow the steps below to get better as quickly as possible. How can you care for yourself at home? Activity    · Rest when you feel tired. You may take a nap, but don't stay in bed all day.     · Work with your physical therapist to learn the best way to exercise.     · You will have a sling to wear at night. And it's a good idea to also put a small stack of folded sheets or towels under your upper arm while you are in bed to keep your arm from dropping too far back.     · Your arm should stay next to your body or in front of it for several weeks, both while you are up and during sleep.   · Don't lift anything with the affected arm for 6 weeks.     · Ask your doctor when you can drive again.     · Ask your doctor when it is okay for you to have sex.     · Your doctor may advise you to give up activities that put stress on that shoulder. This includes sports such as weight lifting or tennis, unless your tennis arm was not the one affected. Diet    · By the time you leave the hospital, you will probably be eating your normal diet. If your stomach is upset, try bland, low-fat foods like plain rice, broiled chicken, toast, and yogurt. Your doctor may recommend that you take iron and vitamin supplements.     · Drink plenty of fluids (unless your doctor tells you not to).     · You may notice that your bowel movements are not regular right after your surgery. This is common. Try to avoid constipation and straining with bowel movements. You may want to take a fiber supplement every day. If you have not had a bowel movement after a couple of days, ask your doctor about taking a mild laxative. Medicines    · Your doctor will tell you if and when you can restart your medicines. He or she will also give you instructions about taking any new medicines.     · If you take blood thinners, such as warfarin (Coumadin), clopidogrel (Plavix), or aspirin, be sure to talk to your doctor. He or she will tell you if and when to start taking those medicines again. Make sure that you understand exactly what your doctor wants you to do.     · Be safe with medicines. Take pain medicines exactly as directed. ¨ If the doctor gave you a prescription medicine for pain, take it as prescribed. ¨ If you are not taking a prescription pain medicine, ask your doctor if you can take an over-the-counter medicine.     · If you think your pain medicine is making you sick to your stomach:  ¨ Take your medicine after meals (unless your doctor has told you not to).   ¨ Ask your doctor for a different pain medicine.     · If your doctor prescribed antibiotics, take them as directed. Don't stop taking them just because you feel better. You need to take the full course of antibiotics.     · If you take a blood thinner, be sure you get instructions about how to take your medicine safely. Blood thinners can cause serious bleeding problems. Incision care    · If your doctor told you how to care for your cut (incision), follow your doctor's instructions. You will have a dressing over the cut. A dressing helps the incision heal and protects it. Your doctor will tell you how to take care of this.     · If you did not get instructions, follow this general advice:  ¨ If you have strips of tape on the cut the doctor made, leave the tape on for a week or until it falls off. ¨ If you have stitches or staples, your doctor will tell you when to come back to have them removed. ¨ If you have skin adhesive on the cut, leave it on until it falls off. Skin adhesive is also called glue or liquid stitches. ¨ Change the bandage every day. ¨ Wash the area daily with warm water, and pat it dry. Don't use hydrogen peroxide or alcohol. They can slow healing. ¨ You may cover the area with a gauze bandage if it oozes fluid or rubs against clothing. ¨ You may shower 24 to 48 hours after surgery. Pat the incision dry. Don't swim or take a bath for the first 2 weeks, or until your doctor tells you it is okay. Exercise    · Shoulder rehabilitation is a series of exercises you do after your surgery. This helps you get back your shoulder's range of motion and strength. You will work with your doctor and physical therapist to plan this exercise program. To get the best results, you need to do the exercises correctly and as often and as long as your doctor tells you.    Ice    · For pain, put ice or a cold pack on the area for 10 to 20 minutes at a time. Put a thin cloth between the ice and your skin. Follow-up care is a key part of your treatment and safety.  Be sure to make and go to all appointments, and call your doctor if you are having problems. It's also a good idea to know your test results and keep a list of the medicines you take. When should you call for help? Call 911 anytime you think you may need emergency care. For example, call if:    · You have severe trouble breathing.     · You have symptoms of a blood clot in your lung (called a pulmonary embolism). These may include:  ¨ Sudden chest pain. ¨ Trouble breathing. ¨ Coughing up blood.    Call your doctor now or seek immediate medical care if:    · You have severe or increasing pain.     · You have symptoms of infection, such as:  ¨ Increased pain, swelling, warmth, or redness. ¨ Red streaks or pus. ¨ A fever.     · You have tingling, weakness, or numbness in your arm.     · Your arm turns cold or changes color.     · You have symptoms of a blood clot in your leg (called a deep vein thrombosis). These may include:  ¨ Pain in the calf, back of the knee, thigh, or groin. ¨ Redness and swelling in the leg or groin.    Watch closely for changes in your health, and be sure to contact your doctor if:    · You do not get better as expected. Where can you learn more? Go to http://lyssa-kamran.info/. Enter N207 in the search box to learn more about \"Shoulder Replacement Surgery: What to Expect at Home. \"  Current as of: March 21, 2017  Content Version: 11.7  © 5630-2627 [employer]. Care instructions adapted under license by [employer] (which disclaims liability or warranty for this information). If you have questions about a medical condition or this instruction, always ask your healthcare professional. [place] [address] any warranty or liability for your use of this information.     DISCHARGE SUMMARY from Nurse    PATIENT INSTRUCTIONS:    After general anesthesia or intravenous sedation, for 24 hours or while taking prescription Narcotics:  · Limit your activities  · Do not drive and operate hazardous machinery  · Do not make important personal or business decisions  · Do  not drink alcoholic beverages  · If you have not urinated within 8 hours after discharge, please contact your surgeon on call. Report the following to your surgeon:  · Excessive pain, swelling, redness or odor of or around the surgical area  · Temperature over 100.5  · Nausea and vomiting lasting longer than 4 hours or if unable to take medications  · Any signs of decreased circulation or nerve impairment to extremity: change in color, persistent  numbness, tingling, coldness or increase pain  · Any questions    What to do at Home:  Recommended activity: No lifting, Driving, or Strenuous until follow up with Dr Army Lobato    If you experience any of the following symptoms : fever greater than 101, redness/warmth/drainage at incision site, no longer able to move arm, or need pain medication refill please call Dr Pernell Brumfield office. *  Please give a list of your current medications to your Primary Care Provider. *  Please update this list whenever your medications are discontinued, doses are      changed, or new medications (including over-the-counter products) are added. *  Please carry medication information at all times in case of emergency situations. These are general instructions for a healthy lifestyle:    No smoking/ No tobacco products/ Avoid exposure to second hand smoke  Surgeon General's Warning:  Quitting smoking now greatly reduces serious risk to your health.     Obesity, smoking, and sedentary lifestyle greatly increases your risk for illness    A healthy diet, regular physical exercise & weight monitoring are important for maintaining a healthy lifestyle    You may be retaining fluid if you have a history of heart failure or if you experience any of the following symptoms:  Weight gain of 3 pounds or more overnight or 5 pounds in a week, increased swelling in our hands or feet or shortness of breath while lying flat in bed. Please call your doctor as soon as you notice any of these symptoms; do not wait until your next office visit. Recognize signs and symptoms of STROKE:    F-face looks uneven    A-arms unable to move or move unevenly    S-speech slurred or non-existent    T-time-call 911 as soon as signs and symptoms begin-DO NOT go       Back to bed or wait to see if you get better-TIME IS BRAIN. Warning Signs of HEART ATTACK     Call 911 if you have these symptoms:   Chest discomfort. Most heart attacks involve discomfort in the center of the chest that lasts more than a few minutes, or that goes away and comes back. It can feel like uncomfortable pressure, squeezing, fullness, or pain.  Discomfort in other areas of the upper body. Symptoms can include pain or discomfort in one or both arms, the back, neck, jaw, or stomach.  Shortness of breath with or without chest discomfort.  Other signs may include breaking out in a cold sweat, nausea, or lightheadedness. Don't wait more than five minutes to call 911 - MINUTES MATTER! Fast action can save your life. Calling 911 is almost always the fastest way to get lifesaving treatment. Emergency Medical Services staff can begin treatment when they arrive -- up to an hour sooner than if someone gets to the hospital by car. The discharge information has been reviewed with the patient. The patient verbalized understanding. Discharge medications reviewed with the patient and appropriate educational materials and side effects teaching were provided. Patient armband removed and shredded    ___________________________________________________________________________________________________________________________________

## 2018-09-26 NOTE — PROGRESS NOTES
09/26/18 0904   Assessment  Type   Assessment Type Shift assessment   Reassessment Performed Changes documented below   Activity and Safety   Activity Level Up with Assistance   Activity In bed;Resting quietly   Activity Assistance Partial (one person)   Weight Bearing Status WBAT (Weight Bearing as Tolerated)   Mode of Transportation Wheelchair   Patient Turned Turns self   Head of Bed Elevated Self regulated   Assistive Device Fall prevention device; Walker (comment); Sling   Safety Measures Bed/Chair-Wheels locked; Bed in low position;Call light within reach; Fall prevention (comment);Gripper socks; Side rails X2   Psychosocial   Psychosocial (WDL) WDL   Purposeful Interaction Yes   Pt Identified Daily Priority Clinical issues (comment)   Caring Interventions Reassure; Therapeutic modalities   Reassure Therapeutic listening;Caring rounds   Therapeutic Modalities Humor; Intentional therapeutic touch   Caritas Process Nurture loving kindness;Create healing environment   Patient Behaviors Calm; Cooperative   Neuro   Neurologic State Alert; Appropriate for age   LUE Motor Response Spontaneous    LLE Motor Response Spontaneous    RUE Motor Response Weak;Purposeful   RLE Motor Response Spontaneous    Orientation Level Oriented X4;Appropriate for age   Cognition Appropriate decision making; Appropriate for age attention/concentration; Appropriate safety awareness; Follows commands   RASS    Warren Agitation Sedation Scale (RASS) 0   Target RASS 0   Confusion Assessment Method (CAM)   Acute Onset and Fluctuating Course (1A) No   Confusion Assessment Method-ICU (CAM-ICU)   Feature 1: Acute Onset or Fluctuating Course Negative   EENT   EENT (WDL) WDL   Visual Impairment None   Visual Aid Glasses; With patient   Respiratory   Respiratory (WDL) WDL   Breath Sounds Bilateral Clear   Incentive Spirometry Treatment   Level of Service Assisted by nursing   Actual Volume (ml) 2000 ml   Treatment Tolerance Patient tolerated; Well   Cardiac Cardiac (WDL) X   B/P Outside of Defined Limits Yes  (183/90)   Cardiac/Telemetry   Cardiac/Telemetry Monitor On No   VTE Prophylaxis (Shift Required Documentation)   Mechanical VTE Orders Yes   Graduated Compression Stockings Bilateral   Sequential Compression Device Bilateral   Peripheral Vascular   Peripheral Vascular (WDL) WDL   RUE Peripheral Vascular    Capillary Refill Less than/equal to 3 seconds   Color Appropriate for race   Temperature Warm   Sensation Present   Radial Pulse Present;Palpable   Abdominal    Abdominal (WDL) WDL   Last Bowel Movement Date 09/24/18   Bowel Sounds Active    Genitourinary   Genitourinary (WDL) WDL   Urine Assessment   Urinary Status Voiding   Urine Color Yellow/straw   Urine Appearance Clear   Urine Odor None   Musculoskeletal   RUE Surgery;Limited movement;Weakness   Skin Integumentary   Skin Integumentary (WDL) X   Skin Color Appropriate for ethnicity   Skin Condition/Temp Dry; Warm   Skin Integrity Incision (comment)  (R shoulder)   Turgor Non-tenting   Hair Growth Present   Varicosities Absent   Wound Prevention and Protection Methods   Orientation of Wound Prevention Posterior   Location of Wound Prevention Buttocks   Dressing Present  No   Wound Offloading (Prevention Methods) Bed, pressure reduction mattress;Pillows   Neuro   Neuro (WDL) WDL   Musculoskeletal   Musculoskeletal (WDL) X       0536 - Patient in bed at this time. A/O x 4. IV to LFA  intact and patent. SCDs to BLE. Mepelex dressing to R shoulder CDI. Pt denies numbness/tingling. All pulses palpable. Lungs clear. Bowel sounds active to all quadrants. Patient able to get to 2000 on the incentive spirometer. Pain 3/10. Will continue to monitor. 7792 PRN Tylenol 650mg administered. Will continue to monitor. 1100 Informed by Osman Barry, PT, pt would like for me to call Dr Jeff Devlin to see if he can be discharged early. Pt previously informed of [facility]'s discharge protocol. [id number] Called Dr Jeff Devlin. Informed Chasity Thomson of pt early discharge request. Dr Chasity Thomson stated: pt could be discharged, inform pt to move arm, pt to see Dr Chasity Thomson in 1 week, this RN to place discharge order. Discharge instructions reviewed with patient at this time. Opportunity for questions and clarification was provided. Patient has verbalized understanding. Patient was provided with care notes to include side effects of RX's. Arm bands removed and shredded. AVS reviewed with . IV removed. Dressing . FLU screening has been completed and verified.  Vaccine during this admission:

## 2018-09-26 NOTE — PROGRESS NOTES
Problem: Mobility Impaired (Adult and Pediatric)  Goal: *Acute Goals and Plan of Care (Insert Text)  In 1-7 days pt will be able to meet following goals:  ST. Supine to sit to sit CGA/S with HR for positioning. 2.  Transfers:   Sit to stand to chair CGA/S with LRAD for ADLs. LT.  Gait:  Ambulate >100ft CGA/S with LRAD, no LOB, for home/community mobility. 2.  Stair Negotiation:  Ascend/descend >2 steps CGA for home entry. 3.  Patient/Family Education:   Independent with HEP and demonstrate competency with don/doff sling for follow-up care and safe discharge. Outcome: Resolved/Met Date Met: 18  physical Therapy TREATMENT/DISCHARGE    Patient: Sergio Recio (23 y.o. male)  Date: 2018  Diagnosis: RIGHT SHOULDER DEGENERATIVE JOINT DISEASE  DJD of right shoulder <principal problem not specified>  Procedure(s) (LRB):  EXACTECH REVERSE TOTAL RIGHT SHOULDER ARTHROPLASTY, GENERAL ANESTHESIA W/SCALENE BLOCK PRN, \"SPEC POP\" (Right) 1 Day Post-Op  Precautions: Fall  Chart, physical therapy assessment, plan of care and goals were reviewed. ASSESSMENT:  Pt meets needs for safe home mobility, expresses understanding of HEP and is cleared from this level of PT. Progression toward goals:  [x]      Goals met  []      Improving appropriately and progressing toward goals  []      Improving slowly and progressing toward goals  []      Not making progress toward goals and plan of care will be adjusted     PLAN:  Patient will be discharged from physical therapy at this time.   Rationale for discharge:  [x] Goals Achieved  [] [address]  [] Patient not participating in therapy  [] Other:  Discharge Recommendations:  Home Health  Further Equipment Recommendations for Discharge:  N/A     SUBJECTIVE:   Patient stated  We hope to leave soon     OBJECTIVE DATA SUMMARY:   Critical Behavior:  Neurologic State: Alert, Appropriate for age  Orientation Level: Oriented X4, Appropriate for age  Cognition: Appropriate decision making, Appropriate for age attention/concentration, Appropriate safety awareness, Follows commands  Safety/Judgement: Awareness of environment  Functional Mobility Training:  Bed Mobility:  Supine to Sit: Supervision  Sit to Supine: Supervision  Transfers:  Sit to Stand: Supervision  Stand to Sit: Supervision  Balance:  Sitting: Intact  Standing: Intact; With support  Ambulation/Gait Training:  Distance (ft): 150 Feet (ft)  Assistive Device: Walker, rolling;Gait belt  Ambulation - Level of Assistance: Supervision  Gait Abnormalities: Decreased step clearance  Base of Support: Widened  Speed/Shweta: Slow  Step Length: Right shortened;Left shortened  Swing Pattern: Right asymmetrical;Left asymmetrical  Interventions: Verbal cues    Stairs:  Number of Stairs Trained: 5  Stairs - Level of Assistance: Contact guard assistance   Rail Use: Left     Therapeutic Exercises:   Reviewed HEP per protocol     Pain:  Pain Scale 1: Numeric (0 - 10)  Pain Intensity 1: 2  Pain Location 1: Shoulder  Pain Orientation 1: Right  Pain Description 1: Aching  Pain Intervention(s) 1: Medication (see MAR)  Activity Tolerance:   Good     After treatment:   [] Patient left in no apparent distress sitting up in chair  [x] Patient left in no apparent distress in bed  [x] Call bell left within reach  [] Nursing notified  [x] Caregiver present  [] Bed alarm activated  Aristeo Minaya PTA   Time Calculation: 18 mins

## 2018-09-26 NOTE — PROGRESS NOTES
Transition of Care ([facility]) Plan:     Chart reviewed, met with pt in room. Pt planning discharge home, wife available to assist as needed. Pt will follow up with MD as scheduled. No other needs identified, CM remains available. [facility] Transportation:   How is patient being transported at discharge? Family/Friend      When? Once cleared by Therapy between 12-2pm     Is transport scheduled? N/A      Follow-up appointment and transportation:   PCP/Specialist?  See AVS for Appointment         Who is transporting to the follow-up appointment? Family/Friend      Is transport for follow up appointment scheduled? N/A    Communication plan (with patient/family): Who is being called? Patient or Next of Kin? Responsible party? Patient      What number(s) is to be used? See Facesheet      What service provider is calling for [facility] services? When are they calling? 24-48 hours following discharge    Readmission Risk? (Green/Low; Yellow/Moderate; Red/High):  Green    Care Management Interventions  PCP Verified by CM:  Yes  Transition of Care Consult (CM Consult): Discharge Planning  Discharge Durable Medical Equipment: No  Physical Therapy Consult: Yes  Current Support Network: Lives with Spouse, Own Home  Confirm Follow Up Transport: Family  Plan discussed with Pt/Family/Caregiver: Yes  Freedom of Choice Offered: Yes  Discharge Location  Discharge Placement: Home with family assistance

## 2018-09-26 NOTE — PROGRESS NOTES
80 - Dr. Leopoldo Carolina ordered that the PCA be discontinued and that 2-4 mg of dilaudid be given to the patient for pain. The patient was not utilizing the PCA to manage pain and requested that he have something by mouth instead.

## 2018-10-19 NOTE — DISCHARGE SUMMARY
Patient: Jamal Guevara               Sex: male          DOA: 9/25/2018         YOB: 1945      Age:  67 y.o.        LOS:  LOS: 1 day        HPI:     Jamal Guevara is a 67 y.o. male who complained of pain and weakness in the right shoulder, unresponsive to NSAID's and interfering with daily activities including sleep. X-rays showed severe gleno-humeral degenerative arthritis and an MRI shows a massive, unrepairable rotator cuff tear. The patient was then admitted for a right reverse total  shoulder arthroplasty. The surgery was then performed on 8/71/7265  with no complications. In the post op period good progress was made with stable vital signs. The neurological exam in the operative arm was normal. Jamal Guevara was cleared for discharge home with the plan to be seen in the office in one week.

## 2022-03-18 PROBLEM — E04.1 LEFT THYROID NODULE: Status: ACTIVE | Noted: 2017-11-28

## 2022-03-19 PROBLEM — M19.011 DJD OF RIGHT SHOULDER: Status: ACTIVE | Noted: 2018-09-25

## 2023-05-17 RX ORDER — METOPROLOL TARTRATE 50 MG/1
TABLET, FILM COATED ORAL 2 TIMES DAILY
COMMUNITY

## 2023-05-17 RX ORDER — LISINOPRIL 40 MG/1
40 TABLET ORAL DAILY
COMMUNITY

## 2023-05-17 RX ORDER — ASPIRIN 81 MG/1
81 TABLET ORAL DAILY
COMMUNITY

## 2023-05-17 RX ORDER — ATORVASTATIN CALCIUM 40 MG/1
40 TABLET, FILM COATED ORAL
COMMUNITY

## (undated) DEVICE — SUTURE FIBERWIRE SZ 2 W/ TAPERED NEEDLE BLUE L38IN NONABSORB BLU L26.5MM 1/2 CIRCLE AR7200

## (undated) DEVICE — GOWN,SIRUS,NONRNF,SETINSLV,XL,20/CS: Brand: MEDLINE

## (undated) DEVICE — FEMORAL CANAL TIP

## (undated) DEVICE — SOLUTION IV 500ML 0.9% SOD CHL FLX CONT

## (undated) DEVICE — DRESSING FOAM SELF ADH 20X10 CM ABSORBENT MEPILEX BORDER

## (undated) DEVICE — HANDPIECE SET WITH FAN SPRAY TIP: Brand: INTERPULSE

## (undated) DEVICE — SUT VCRL + 2-0 36IN CT1 UD --

## (undated) DEVICE — SINGLE PORT MANIFOLD: Brand: NEPTUNE 2

## (undated) DEVICE — SOLUTION LACTATED RINGERS INJECTION USP

## (undated) DEVICE — PACK PROCEDURE SURG TOT SHLDR CUST

## (undated) DEVICE — SUTURE VCRL 0 L27IN ABSRB OS-6 BRAID COAT UD J534H

## (undated) DEVICE — NEEDLE HYPO 21GA L1.5IN INTRAMUSCULAR S STL LATCH BVL UP

## (undated) DEVICE — SUTURE STRATAFIX SZ 3-0 L30CM NONABSORBABLE UD L19MM FS-2 SXMP2B408

## (undated) DEVICE — KENDALL SCD EXPRESS SLEEVES, KNEE LENGTH, MEDIUM: Brand: KENDALL SCD

## (undated) DEVICE — (D)PREP SKN CHLRAPRP APPL 26ML -- CONVERT TO ITEM 371833

## (undated) DEVICE — REM POLYHESIVE ADULT PATIENT RETURN ELECTRODE: Brand: VALLEYLAB

## (undated) DEVICE — ASTOUND STANDARD SURGICAL GOWN, XXL: Brand: CONVERTORS

## (undated) DEVICE — 3M™ STERI-DRAPE™ U-DRAPE 1015: Brand: STERI-DRAPE™

## (undated) DEVICE — BLADE SAW 1.27X90 MM FOR LG BNE [KMS2513PM62STE] [KOMET MEDICAL]

## (undated) DEVICE — SUTURE FIBERWIRE SZ 2 L38IN 97CM NONABSB BLU L26.5MM W/TWO TAPERED NEEDLES  1/2 CIRCLE AR7205

## (undated) DEVICE — SOL IRR STRL H2O 1500ML BTL --

## (undated) DEVICE — DEVON™ KNEE AND BODY STRAP 60" X 3" (1.5 M X 7.6 CM): Brand: DEVON